# Patient Record
Sex: FEMALE | Race: BLACK OR AFRICAN AMERICAN | NOT HISPANIC OR LATINO | URBAN - METROPOLITAN AREA
[De-identification: names, ages, dates, MRNs, and addresses within clinical notes are randomized per-mention and may not be internally consistent; named-entity substitution may affect disease eponyms.]

---

## 2020-07-25 ENCOUNTER — TELEPHONE ENCOUNTER (OUTPATIENT)
Dept: URBAN - METROPOLITAN AREA CLINIC 13 | Facility: CLINIC | Age: 45
End: 2020-07-25

## 2020-07-25 RX ORDER — LISINOPRIL 20 MG/1
TAKE 1 TABLET DAILY TABLET ORAL
Refills: 0 | OUTPATIENT
Start: 2020-04-08 | End: 2020-05-28

## 2020-07-26 ENCOUNTER — TELEPHONE ENCOUNTER (OUTPATIENT)
Dept: URBAN - METROPOLITAN AREA CLINIC 13 | Facility: CLINIC | Age: 45
End: 2020-07-26

## 2020-07-26 RX ORDER — SUCRALFATE 1 G/1
TAKE 1 TABLET BY MOUTH BEFORE MEAL(S) AND AT BEDTIME ON AN EMPTY STOMA TABLET ORAL
Qty: 120 | Refills: 0 | Status: ACTIVE | COMMUNITY
Start: 2020-04-26

## 2020-07-26 RX ORDER — AMLODIPINE BESYLATE 5 MG/1
TK 1 T PO QD TABLET ORAL
Qty: 30 | Refills: 0 | Status: ACTIVE | COMMUNITY
Start: 2020-01-05

## 2020-07-26 RX ORDER — OMEPRAZOLE 40 MG/1
TK 1 C PO BID CAPSULE, DELAYED RELEASE ORAL
Qty: 180 | Refills: 0 | Status: ACTIVE | COMMUNITY
Start: 2019-03-13

## 2020-07-26 RX ORDER — CLOTRIMAZOLE AND BETAMETHASONE DIPROPIONATE 10; .5 MG/G; MG/G
APPLY CREAM TO AFFECTED AREA TWICE DAILY FOR 7 DAYS CREAM TOPICAL
Qty: 15 | Refills: 0 | Status: ACTIVE | COMMUNITY
Start: 2019-12-09

## 2020-07-26 RX ORDER — OLMESARTAN MEDOXOMIL 20 MG/1
TAKE 1 TABLET BY MOUTH ONCE DAILY TABLET, FILM COATED ORAL
Qty: 30 | Refills: 0 | Status: ACTIVE | COMMUNITY
Start: 2020-04-24

## 2020-07-26 RX ORDER — BUTALBITAL, ACETAMINOPHEN, AND CAFFEINE 50; 325; 40 MG/1; MG/1; MG/1
TK 1-2 TS PO Q 6 H PRF HA TABLET ORAL
Qty: 20 | Refills: 0 | Status: ACTIVE | COMMUNITY
Start: 2020-04-22

## 2020-07-26 RX ORDER — MONTELUKAST SODIUM 10 MG/1
TAKE 1 TABLET BY MOUTH ONCE DAILY TABLET, FILM COATED ORAL
Qty: 30 | Refills: 0 | Status: ACTIVE | COMMUNITY
Start: 2019-04-15

## 2020-07-26 RX ORDER — AMOXICILLIN 500 MG/1
TAKE 2 CAPSULES BY MOUTH TWICE DAILY CAPSULE ORAL
Qty: 40 | Refills: 0 | Status: ACTIVE | COMMUNITY
Start: 2020-03-02

## 2020-07-26 RX ORDER — AMLODIPINE BESYLATE 10 MG/1
TAKE 1 TABLET BY MOUTH ONCE DAILY TABLET ORAL
Qty: 90 | Refills: 0 | Status: ACTIVE | COMMUNITY
Start: 2020-02-19

## 2020-07-26 RX ORDER — CLINDAMYCIN HYDROCHLORIDE 300 MG/1
TAKE 1 CAPSULE BY MOUTH THREE TIMES DAILY CAPSULE ORAL
Qty: 15 | Refills: 0 | Status: ACTIVE | COMMUNITY
Start: 2020-01-08

## 2020-07-26 RX ORDER — ONDANSETRON 4 MG/1
DISSOLVE 1 TABLET IN MOUTH EVERY 8 HOURS AS NEEDED FOR NAUSEA AND VOMI TABLET, ORALLY DISINTEGRATING ORAL
Qty: 10 | Refills: 0 | Status: ACTIVE | COMMUNITY
Start: 2020-03-08

## 2020-07-26 RX ORDER — LEVOCETIRIZINE DIHYDROCHLORIDE 5 MG/1
TAKE 1 TABLET BY MOUTH AT BEDTIME TABLET ORAL
Qty: 30 | Refills: 0 | Status: ACTIVE | COMMUNITY
Start: 2018-08-13

## 2020-07-26 RX ORDER — SUCRALFATE 1 G/10ML
TK 10ML PO QID SUSPENSION ORAL
Qty: 420 | Refills: 0 | Status: ACTIVE | COMMUNITY
Start: 2020-03-27

## 2020-07-26 RX ORDER — SUMATRIPTAN SUCCINATE 25 MG/1
TAKE 1 TABLET BY MOUTH EVERY 2 HOURS AS NEEDED FOR MIGRAINE TABLET ORAL
Qty: 9 | Refills: 0 | Status: ACTIVE | COMMUNITY
Start: 2020-02-19

## 2020-07-26 RX ORDER — PREDNISONE 10 MG/1
TAKE 4 TABLETS BY MOUTH ONCE DAILY FOR 2 DAYS THEN 3 ONCE DAILY FOR 2 TABLET ORAL
Qty: 18 | Refills: 0 | Status: ACTIVE | COMMUNITY
Start: 2020-01-15

## 2020-07-26 RX ORDER — FAMOTIDINE 40 MG/1
TK 1 T PO BID TABLET ORAL
Qty: 60 | Refills: 0 | Status: ACTIVE | COMMUNITY
Start: 2020-01-13

## 2020-07-26 RX ORDER — OMEPRAZOLE 20 MG/1
TAKE 1 CAPSULE BY MOUTH ONCE DAILY CAPSULE, DELAYED RELEASE ORAL
Qty: 30 | Refills: 0 | Status: ACTIVE | COMMUNITY
Start: 2018-09-10

## 2020-07-26 RX ORDER — SUCRALFATE 1 G/1
TK 1 T PO BID BEFORE MEALS TABLET ORAL
Qty: 20 | Refills: 0 | Status: ACTIVE | COMMUNITY
Start: 2020-02-25

## 2020-07-26 RX ORDER — DEXLANSOPRAZOLE 30 MG/1
TAKE 1 CAPSULE DAILY EVERY MORNING BEFORE BREAKFAST CAPSULE, DELAYED RELEASE ORAL
Refills: 3 | Status: ACTIVE | COMMUNITY

## 2020-07-26 RX ORDER — HYDROCHLOROTHIAZIDE 25 MG/1
TAKE 1 TABLET BY MOUTH ONCE DAILY TABLET ORAL
Qty: 90 | Refills: 0 | Status: ACTIVE | COMMUNITY
Start: 2019-11-16

## 2020-07-26 RX ORDER — HYDROCODONE BITARTRATE AND ACETAMINOPHEN 5; 325 MG/1; MG/1
TK 1 T PO Q 6 H PRN FOR PAIN TABLET ORAL
Qty: 10 | Refills: 0 | Status: ACTIVE | COMMUNITY
Start: 2020-02-25

## 2020-07-26 RX ORDER — CIPROFLOXACIN HYDROCHLORIDE 500 MG/1
TAKE 1 TABLET BY MOUTH TWICE DAILY TABLET, FILM COATED ORAL
Qty: 20 | Refills: 0 | Status: ACTIVE | COMMUNITY
Start: 2019-10-16

## 2020-07-26 RX ORDER — METOCLOPRAMIDE 10 MG/1
TAKE 1 TABLET BY MOUTH BEFORE MEAL(S) AND AT BEDTIME FOR 7 DAYS TABLET ORAL
Qty: 30 | Refills: 0 | Status: ACTIVE | COMMUNITY
Start: 2020-04-26

## 2020-07-26 RX ORDER — SUCRALFATE 1 G/10ML
TAKE 10ML BY MOUTH 4 TIMES A DAY SUSPENSION ORAL
Qty: 420 | Refills: 0 | Status: ACTIVE | COMMUNITY
Start: 2020-04-20

## 2020-07-26 RX ORDER — METOCLOPRAMIDE 10 MG/1
TAKE 1 TABLET BY MOUTH EVERY 6 TO 8 HOURS WITH MEALS TABLET ORAL
Qty: 120 | Refills: 0 | Status: ACTIVE | COMMUNITY
Start: 2020-03-28

## 2020-07-26 RX ORDER — DOCUSATE SODIUM 100 MG/1
TAKE 1 CAPSULE TWICE DAILY AS NEEDED CAPSULE, LIQUID FILLED ORAL
Refills: 0 | Status: ACTIVE | COMMUNITY
Start: 2020-05-19

## 2020-07-26 RX ORDER — OLMESARTAN MEDOXOMIL 20 MG/1
TAKE 1 & 1 2 (ONE & ONE HALF) TABLETS BY MOUTH ONCE DAILY TABLET, FILM COATED ORAL
Qty: 30 | Refills: 0 | Status: ACTIVE | COMMUNITY
Start: 2020-05-08

## 2020-07-26 RX ORDER — ESOMEPRAZOLE MAGNESIUM 40 MG/1
TAKE 1 CAPSULE BY MOUTH ONCE DAILY CAPSULE, DELAYED RELEASE PELLETS ORAL
Qty: 30 | Refills: 0 | Status: ACTIVE | COMMUNITY
Start: 2020-01-19

## 2020-07-26 RX ORDER — HYDROCHLOROTHIAZIDE 25 MG/1
TAKE 1 TABLET BY MOUTH ONCE DAILY TABLET ORAL
Qty: 30 | Refills: 0 | Status: ACTIVE | COMMUNITY
Start: 2019-04-15

## 2020-07-26 RX ORDER — PROMETHAZINE HYDROCHLORIDE 25 MG/1
TAKE 1 TABLET BY MOUTH EVERY 6 HOURS AS NEEDED FOR NAUSEA FOR UP TO 7 TABLET ORAL
Qty: 20 | Refills: 0 | Status: ACTIVE | COMMUNITY
Start: 2020-04-12

## 2020-07-26 RX ORDER — FLUTICASONE PROPIONATE 50 UG/1
USE 2 SPRAY(S) IN EACH NOSTRIL ONCE DAILY SPRAY, METERED NASAL
Qty: 16 | Refills: 0 | Status: ACTIVE | COMMUNITY
Start: 2019-12-07

## 2020-07-26 RX ORDER — POLYETHYLENE GLYCOL 3350, SODIUM CHLORIDE, SODIUM BICARBONATE AND POTASSIUM CHLORIDE WITH LEMON FLAVOR 420; 11.2; 5.72; 1.48 G/4L; G/4L; G/4L; G/4L
MIX AND DRINK UTD POWDER, FOR SOLUTION ORAL
Qty: 4000 | Refills: 0 | Status: ACTIVE | COMMUNITY
Start: 2020-04-27

## 2020-07-26 RX ORDER — OLMESARTAN MEDOXOMIL 40 MG/1
TK 1 T PO ONCE A DAY TABLET ORAL
Qty: 30 | Refills: 0 | Status: ACTIVE | COMMUNITY
Start: 2020-05-11

## 2020-07-26 RX ORDER — AMOXICILLIN 500 MG/1
TAKE 1 CAPSULE BY MOUTH EVERY 12 HOURS CAPSULE ORAL
Qty: 20 | Refills: 0 | Status: ACTIVE | COMMUNITY
Start: 2019-12-07

## 2020-07-26 RX ORDER — CETIRIZINE HYDROCHLORIDE 10 MG/1
TAKE 1 TABLET BY MOUTH ONCE DAILY TABLET, FILM COATED ORAL
Qty: 14 | Refills: 0 | Status: ACTIVE | COMMUNITY
Start: 2019-12-07

## 2020-07-26 RX ORDER — DOCUSATE SODIUM 100 MG/1
TAKE 1 CAPSULE BY MOUTH TWICE DAILY AS NEEDED FOR CONSTIPATION CAPSULE, LIQUID FILLED ORAL
Qty: 60 | Refills: 0 | Status: ACTIVE | COMMUNITY
Start: 2020-04-01

## 2020-07-26 RX ORDER — SUMATRIPTAN SUCCINATE 50 MG/1
TAKE 1 TABLET BY MOUTH ONCE DAILY AS NEEDED FOR MIGRAINE HEADACHE TABLET, FILM COATED ORAL
Qty: 9 | Refills: 0 | Status: ACTIVE | COMMUNITY
Start: 2019-10-16

## 2020-07-26 RX ORDER — PANTOPRAZOLE SODIUM 40 MG/1
TAKE 1 TABLET BY MOUTH ONCE DAILY IF SYMPTOMS PERSIST AFTER 1 WEEK TAK TABLET, DELAYED RELEASE ORAL
Qty: 53 | Refills: 0 | Status: ACTIVE | COMMUNITY
Start: 2020-02-28

## 2020-07-26 RX ORDER — CLARITHROMYCIN 500 MG/1
TAKE 1 TABLET BY MOUTH TWICE DAILY TABLET, FILM COATED ORAL
Qty: 20 | Refills: 0 | Status: ACTIVE | COMMUNITY
Start: 2020-03-02

## 2020-07-26 RX ORDER — POTASSIUM CHLORIDE 20 MEQ/1
TAKE 1 TABLET BY MOUTH TWICE DAILY TABLET, EXTENDED RELEASE ORAL
Qty: 6 | Refills: 0 | Status: ACTIVE | COMMUNITY
Start: 2020-01-04

## 2020-07-26 RX ORDER — BUDESONIDE 3 MG/1
TAKE 3 CAPSULES BY MOUTH IN THE MORNING CAPSULE, COATED PELLETS ORAL
Qty: 60 | Refills: 0 | Status: ACTIVE | COMMUNITY
Start: 2020-05-07

## 2020-07-26 RX ORDER — KETOROLAC TROMETHAMINE 10 MG/1
TAKE 1 TABLET BY MOUTH EVERY 12 HOURS AS NEEDED FOR PAIN TABLET, FILM COATED ORAL
Qty: 10 | Refills: 0 | Status: ACTIVE | COMMUNITY
Start: 2019-12-07

## 2020-07-26 RX ORDER — SULFAMETHOXAZOLE AND TRIMETHOPRIM 800; 160 MG/1; MG/1
TAKE 1 TABLET BY MOUTH TWICE DAILY TABLET ORAL
Qty: 6 | Refills: 0 | Status: ACTIVE | COMMUNITY
Start: 2019-05-24

## 2020-07-26 RX ORDER — HYDROCHLOROTHIAZIDE 12.5 MG/1
TAKE 1 TABLET BY MOUTH ONCE DAILY TABLET ORAL
Qty: 30 | Refills: 0 | Status: ACTIVE | COMMUNITY
Start: 2019-10-16

## 2020-07-26 RX ORDER — CEPHALEXIN 500 MG/1
TK ONE C PO QID FOR 7 DAYS CAPSULE ORAL
Qty: 28 | Refills: 0 | Status: ACTIVE | COMMUNITY
Start: 2020-05-24

## 2020-07-26 RX ORDER — PREDNISONE 10 MG/1
TAKE 4 TABLETS BY MOUTH ONCE DAILY FOR 2 DAYS THEN 3 TABS ONCE DAILY F TABLET ORAL
Qty: 16 | Refills: 0 | Status: ACTIVE | COMMUNITY
Start: 2019-12-09

## 2020-07-26 RX ORDER — DOXYCYCLINE 100 MG/1
TAKE 1 CAPSULE BY MOUTH TWICE DAILY FOR 10 DAYS CAPSULE ORAL
Qty: 20 | Refills: 0 | Status: ACTIVE | COMMUNITY
Start: 2019-12-19

## 2020-07-26 RX ORDER — OLMESARTAN MEDOXOMIL 40 MG/1
TK 2 TS PO D FOR 60 DAYS TABLET ORAL
Qty: 30 | Refills: 0 | Status: ACTIVE | COMMUNITY
Start: 2020-05-15

## 2020-07-26 RX ORDER — LANSOPRAZOLE 30 MG/1
TAKE 1 CAPSULE TWICE DAILY CAPSULE, DELAYED RELEASE ORAL
Qty: 60 | Refills: 0 | Status: ACTIVE | COMMUNITY
Start: 2020-05-28

## 2020-07-26 RX ORDER — POTASSIUM CHLORIDE 750 MG/1
TAKE 1 TABLET BY MOUTH ONCE DAILY TABLET, EXTENDED RELEASE ORAL
Qty: 10 | Refills: 0 | Status: ACTIVE | COMMUNITY
Start: 2020-04-01

## 2020-07-26 RX ORDER — FAMOTIDINE 40 MG/1
TAKE 1 TABLET BY MOUTH TWICE DAILY AS NEEDED TABLET ORAL
Qty: 60 | Refills: 0 | Status: ACTIVE | COMMUNITY
Start: 2019-10-16

## 2020-07-26 RX ORDER — DICYCLOMINE HYDROCHLORIDE 20 MG/1
TAKE 1 TABLET PO EVERY 6 HOURS PRN TABLET ORAL
Qty: 20 | Refills: 0 | Status: ACTIVE | COMMUNITY
Start: 2020-05-19

## 2020-07-26 RX ORDER — LISINOPRIL 20 MG/1
TK 1 T PO QD TABLET ORAL
Qty: 90 | Refills: 0 | Status: ACTIVE | COMMUNITY
Start: 2020-04-08

## 2023-01-20 ENCOUNTER — OFFICE VISIT (OUTPATIENT)
Dept: URBAN - METROPOLITAN AREA CLINIC 113 | Facility: CLINIC | Age: 48
End: 2023-01-20
Payer: COMMERCIAL

## 2023-01-20 VITALS
SYSTOLIC BLOOD PRESSURE: 126 MMHG | WEIGHT: 174 LBS | DIASTOLIC BLOOD PRESSURE: 80 MMHG | TEMPERATURE: 97.6 F | BODY MASS INDEX: 26.37 KG/M2 | HEIGHT: 68 IN | HEART RATE: 101 BPM | RESPIRATION RATE: 18 BRPM

## 2023-01-20 DIAGNOSIS — R19.7 DIARRHEA: ICD-10-CM

## 2023-01-20 DIAGNOSIS — R10.84 GENERALIZED ABDOMINAL PAIN: ICD-10-CM

## 2023-01-20 PROCEDURE — 99214 OFFICE O/P EST MOD 30 MIN: CPT | Performed by: NURSE PRACTITIONER

## 2023-01-20 RX ORDER — ONDANSETRON 4 MG/1
DISSOLVE 1 TABLET IN MOUTH EVERY 8 HOURS AS NEEDED FOR NAUSEA AND VOMI TABLET, ORALLY DISINTEGRATING ORAL
Qty: 10 | Refills: 0 | Status: ON HOLD | COMMUNITY
Start: 2020-03-08

## 2023-01-20 RX ORDER — SUMATRIPTAN SUCCINATE 25 MG/1
TAKE 1 TABLET BY MOUTH EVERY 2 HOURS AS NEEDED FOR MIGRAINE TABLET ORAL
Qty: 9 | Refills: 0 | Status: ON HOLD | COMMUNITY
Start: 2020-02-19

## 2023-01-20 RX ORDER — OLMESARTAN MEDOXOMIL 20 MG/1
TAKE 1 TABLET BY MOUTH ONCE DAILY TABLET, FILM COATED ORAL
Qty: 30 | Refills: 0 | Status: ON HOLD | COMMUNITY
Start: 2020-04-24

## 2023-01-20 RX ORDER — FLUTICASONE PROPIONATE 50 UG/1
USE 2 SPRAY(S) IN EACH NOSTRIL ONCE DAILY SPRAY, METERED NASAL
Qty: 16 | Refills: 0 | Status: ACTIVE | COMMUNITY
Start: 2019-12-07

## 2023-01-20 RX ORDER — CLINDAMYCIN HYDROCHLORIDE 300 MG/1
TAKE 1 CAPSULE BY MOUTH THREE TIMES DAILY CAPSULE ORAL
Qty: 15 | Refills: 0 | Status: ON HOLD | COMMUNITY
Start: 2020-01-08

## 2023-01-20 RX ORDER — DICYCLOMINE HYDROCHLORIDE 20 MG/1
TAKE 1 TABLET PO EVERY 6 HOURS PRN TABLET ORAL
Qty: 20 | Refills: 0 | Status: ON HOLD | COMMUNITY
Start: 2020-05-19

## 2023-01-20 RX ORDER — FAMOTIDINE 40 MG/1
TAKE 1 TABLET BY MOUTH TWICE DAILY AS NEEDED TABLET ORAL
Qty: 60 | Refills: 0 | Status: ACTIVE | COMMUNITY
Start: 2019-10-16

## 2023-01-20 RX ORDER — POTASSIUM CHLORIDE 750 MG/1
TAKE 1 TABLET BY MOUTH ONCE DAILY TABLET, EXTENDED RELEASE ORAL
Qty: 10 | Refills: 0 | Status: ON HOLD | COMMUNITY
Start: 2020-04-01

## 2023-01-20 RX ORDER — SUMATRIPTAN SUCCINATE 50 MG/1
TAKE 1 TABLET BY MOUTH ONCE DAILY AS NEEDED FOR MIGRAINE HEADACHE TABLET, FILM COATED ORAL
Qty: 9 | Refills: 0 | Status: ON HOLD | COMMUNITY
Start: 2019-10-16

## 2023-01-20 RX ORDER — CEPHALEXIN 500 MG/1
TK ONE C PO QID FOR 7 DAYS CAPSULE ORAL
Qty: 28 | Refills: 0 | Status: ON HOLD | COMMUNITY
Start: 2020-05-24

## 2023-01-20 RX ORDER — AMOXICILLIN 500 MG/1
TAKE 1 CAPSULE BY MOUTH EVERY 12 HOURS CAPSULE ORAL
Qty: 20 | Refills: 0 | Status: ON HOLD | COMMUNITY
Start: 2019-12-07

## 2023-01-20 RX ORDER — HYDROCHLOROTHIAZIDE 12.5 MG/1
TAKE 1 TABLET BY MOUTH ONCE DAILY TABLET ORAL
Qty: 30 | Refills: 0 | Status: ON HOLD | COMMUNITY
Start: 2019-10-16

## 2023-01-20 RX ORDER — BUTALBITAL, ACETAMINOPHEN, AND CAFFEINE 50; 325; 40 MG/1; MG/1; MG/1
TK 1-2 TS PO Q 6 H PRF HA TABLET ORAL
Qty: 20 | Refills: 0 | Status: ON HOLD | COMMUNITY
Start: 2020-04-22

## 2023-01-20 RX ORDER — AMLODIPINE BESYLATE 10 MG/1
TAKE 1 TABLET BY MOUTH ONCE DAILY TABLET ORAL
Qty: 90 | Refills: 0 | Status: ON HOLD | COMMUNITY
Start: 2020-02-19

## 2023-01-20 RX ORDER — HYDROCHLOROTHIAZIDE 25 MG/1
TAKE 1 TABLET BY MOUTH ONCE DAILY TABLET ORAL
Qty: 30 | Refills: 0 | Status: ACTIVE | COMMUNITY
Start: 2019-04-15

## 2023-01-20 RX ORDER — OMEPRAZOLE 20 MG/1
TAKE 1 CAPSULE BY MOUTH ONCE DAILY CAPSULE, DELAYED RELEASE ORAL
Qty: 30 | Refills: 0 | Status: ON HOLD | COMMUNITY
Start: 2018-09-10

## 2023-01-20 RX ORDER — CLARITHROMYCIN 500 MG/1
TAKE 1 TABLET BY MOUTH TWICE DAILY TABLET, FILM COATED ORAL
Qty: 20 | Refills: 0 | Status: ON HOLD | COMMUNITY
Start: 2020-03-02

## 2023-01-20 RX ORDER — SUCRALFATE 1 G/10ML
TK 10ML PO QID SUSPENSION ORAL
Qty: 420 | Refills: 0 | Status: ON HOLD | COMMUNITY
Start: 2020-03-27

## 2023-01-20 RX ORDER — BUDESONIDE 3 MG/1
TAKE 3 CAPSULES BY MOUTH IN THE MORNING CAPSULE, COATED PELLETS ORAL
Qty: 60 | Refills: 0 | Status: ACTIVE | COMMUNITY
Start: 2020-05-07

## 2023-01-20 RX ORDER — OLMESARTAN MEDOXOMIL 40 MG/1
TK 2 TS PO D FOR 60 DAYS TABLET ORAL
Qty: 30 | Refills: 0 | Status: ON HOLD | COMMUNITY
Start: 2020-05-15

## 2023-01-20 RX ORDER — PREDNISONE 10 MG/1
TAKE 4 TABLETS BY MOUTH ONCE DAILY FOR 2 DAYS THEN 3 TABS ONCE DAILY F TABLET ORAL
Qty: 16 | Refills: 0 | Status: ON HOLD | COMMUNITY
Start: 2019-12-09

## 2023-01-20 RX ORDER — CETIRIZINE HYDROCHLORIDE 10 MG/1
TAKE 1 TABLET BY MOUTH ONCE DAILY TABLET, FILM COATED ORAL
Qty: 14 | Refills: 0 | Status: ACTIVE | COMMUNITY
Start: 2019-12-07

## 2023-01-20 RX ORDER — PANTOPRAZOLE SODIUM 40 MG/1
TAKE 1 TABLET BY MOUTH ONCE DAILY IF SYMPTOMS PERSIST AFTER 1 WEEK TAK TABLET, DELAYED RELEASE ORAL
Qty: 53 | Refills: 0 | Status: ACTIVE | COMMUNITY
Start: 2020-02-28

## 2023-01-20 RX ORDER — LANSOPRAZOLE 30 MG/1
TAKE 1 CAPSULE TWICE DAILY CAPSULE, DELAYED RELEASE ORAL
Qty: 60 | Refills: 0 | Status: ON HOLD | COMMUNITY
Start: 2020-05-28

## 2023-01-20 RX ORDER — SULFAMETHOXAZOLE AND TRIMETHOPRIM 800; 160 MG/1; MG/1
TAKE 1 TABLET BY MOUTH TWICE DAILY TABLET ORAL
Qty: 6 | Refills: 0 | Status: ON HOLD | COMMUNITY
Start: 2019-05-24

## 2023-01-20 RX ORDER — LISINOPRIL 20 MG/1
TK 1 T PO QD TABLET ORAL
Qty: 90 | Refills: 0 | Status: ON HOLD | COMMUNITY
Start: 2020-04-08

## 2023-01-20 RX ORDER — METOCLOPRAMIDE 10 MG/1
TAKE 1 TABLET BY MOUTH EVERY 6 TO 8 HOURS WITH MEALS TABLET ORAL
Qty: 120 | Refills: 0 | Status: ON HOLD | COMMUNITY
Start: 2020-03-28

## 2023-01-20 RX ORDER — AMLODIPINE BESYLATE 5 MG/1
TK 1 T PO QD TABLET ORAL
Qty: 30 | Refills: 0 | Status: ON HOLD | COMMUNITY
Start: 2020-01-05

## 2023-01-20 RX ORDER — OLMESARTAN MEDOXOMIL 40 MG/1
TK 1 T PO ONCE A DAY TABLET ORAL
Qty: 30 | Refills: 0 | Status: ON HOLD | COMMUNITY
Start: 2020-05-11

## 2023-01-20 RX ORDER — KETOROLAC TROMETHAMINE 10 MG/1
TAKE 1 TABLET BY MOUTH EVERY 12 HOURS AS NEEDED FOR PAIN TABLET, FILM COATED ORAL
Qty: 10 | Refills: 0 | Status: ON HOLD | COMMUNITY
Start: 2019-12-07

## 2023-01-20 RX ORDER — PROMETHAZINE HYDROCHLORIDE 25 MG/1
TAKE 1 TABLET BY MOUTH EVERY 6 HOURS AS NEEDED FOR NAUSEA FOR UP TO 7 TABLET ORAL
Qty: 20 | Refills: 0 | Status: ON HOLD | COMMUNITY
Start: 2020-04-12

## 2023-01-20 RX ORDER — ESOMEPRAZOLE MAGNESIUM 40 MG/1
TAKE 1 CAPSULE BY MOUTH ONCE DAILY CAPSULE, DELAYED RELEASE PELLETS ORAL
Qty: 30 | Refills: 0 | Status: ON HOLD | COMMUNITY
Start: 2020-01-19

## 2023-01-20 RX ORDER — SUCRALFATE 1 G/1
TK 1 T PO BID BEFORE MEALS TABLET ORAL
Qty: 20 | Refills: 0 | Status: ON HOLD | COMMUNITY
Start: 2020-02-25

## 2023-01-20 RX ORDER — DEXLANSOPRAZOLE 30 MG/1
TAKE 1 CAPSULE DAILY EVERY MORNING BEFORE BREAKFAST CAPSULE, DELAYED RELEASE ORAL
Refills: 3 | Status: ON HOLD | COMMUNITY

## 2023-01-20 RX ORDER — DOCUSATE SODIUM 100 MG/1
TAKE 1 CAPSULE BY MOUTH TWICE DAILY AS NEEDED FOR CONSTIPATION CAPSULE, LIQUID FILLED ORAL
Qty: 60 | Refills: 0 | Status: ON HOLD | COMMUNITY
Start: 2020-04-01

## 2023-01-20 RX ORDER — POLYETHYLENE GLYCOL 3350, SODIUM CHLORIDE, SODIUM BICARBONATE AND POTASSIUM CHLORIDE WITH LEMON FLAVOR 420; 11.2; 5.72; 1.48 G/4L; G/4L; G/4L; G/4L
MIX AND DRINK UTD POWDER, FOR SOLUTION ORAL
Qty: 4000 | Refills: 0 | Status: ON HOLD | COMMUNITY
Start: 2020-04-27

## 2023-01-20 RX ORDER — CLOTRIMAZOLE AND BETAMETHASONE DIPROPIONATE 10; .5 MG/G; MG/G
APPLY CREAM TO AFFECTED AREA TWICE DAILY FOR 7 DAYS CREAM TOPICAL
Qty: 15 | Refills: 0 | Status: ON HOLD | COMMUNITY
Start: 2019-12-09

## 2023-01-20 RX ORDER — OMEPRAZOLE 40 MG/1
TK 1 C PO BID CAPSULE, DELAYED RELEASE ORAL
Qty: 180 | Refills: 0 | Status: ON HOLD | COMMUNITY
Start: 2019-03-13

## 2023-01-20 RX ORDER — POTASSIUM CHLORIDE 20 MEQ/1
TAKE 1 TABLET BY MOUTH TWICE DAILY TABLET, EXTENDED RELEASE ORAL
Qty: 6 | Refills: 0 | Status: ON HOLD | COMMUNITY
Start: 2020-01-04

## 2023-01-20 RX ORDER — CIPROFLOXACIN HYDROCHLORIDE 500 MG/1
TAKE 1 TABLET BY MOUTH TWICE DAILY TABLET, FILM COATED ORAL
Qty: 20 | Refills: 0 | Status: ON HOLD | COMMUNITY
Start: 2019-10-16

## 2023-01-20 RX ORDER — HYDROCODONE BITARTRATE AND ACETAMINOPHEN 5; 325 MG/1; MG/1
TK 1 T PO Q 6 H PRN FOR PAIN TABLET ORAL
Qty: 10 | Refills: 0 | Status: ON HOLD | COMMUNITY
Start: 2020-02-25

## 2023-01-20 RX ORDER — MONTELUKAST SODIUM 10 MG/1
TAKE 1 TABLET BY MOUTH ONCE DAILY TABLET, FILM COATED ORAL
Qty: 30 | Refills: 0 | Status: ON HOLD | COMMUNITY
Start: 2019-04-15

## 2023-01-20 RX ORDER — LEVOCETIRIZINE DIHYDROCHLORIDE 5 MG/1
TAKE 1 TABLET BY MOUTH AT BEDTIME TABLET ORAL
Qty: 30 | Refills: 0 | Status: ON HOLD | COMMUNITY
Start: 2018-08-13

## 2023-01-20 RX ORDER — DOXYCYCLINE 100 MG/1
TAKE 1 CAPSULE BY MOUTH TWICE DAILY FOR 10 DAYS CAPSULE ORAL
Qty: 20 | Refills: 0 | Status: ON HOLD | COMMUNITY
Start: 2019-12-19

## 2023-01-20 NOTE — HPI-TODAY'S VISIT:
46yo female with a history of hypertension, GERD, presenting for evaluation of abdominal pain.  She was previously seen in the office in May 2020 for a second opinion regarding chronic GI symptoms of nausea, regurgitation, vomiting, abdominal pain, and constipation. Previous 4-hour gastric emptying study suggested gastroparesis. Prior EGD was fairly unremarkable but colonoscopy did reveal focal active colitis possibly related to infectious enteritis or NSAID use, and removal of a serrated adenoma. US and CT were unremarkable but gastrin and chromogranin levels were elevated concerning for underlying gastrinoma. She was recommended somatostatin receptor-based imaging study and/or EUS. Functional dyspepsia and irritable bowel syndrome were within the differential. Various options were discussed, including consideration for a trial of Motegrity, smart pill to assess whole gut motility, EUS, and capsule endoscopy. She was encouraged to follow-up with a GI who accepted her insurance with consideration for a referral to a tertiary care center. She ultimately returned to [facility] following that last visit due to insurance barriers. She has undergone multiple imaging studies, repeat colonoscopy, and stool studies. She states she experienced a symptom exacerbation in July and underwent a colonoscopy in October which showed inflammation. She was started on budesonide and the symptoms resolved, but have recurred. She does not have any knowledge of a diagnosis of microscopic or collagenous colitis, repeatedly stating "they cannot figure out where the inflammation is coming from." She complains of mid to lower abdominal cramping and diffuse bloating. She is having 4-5 loose stools per day with associated mucus but no red blood. Her PCP started her on a repeat 4-week course of budesonide about two days ago and her symptoms have already improved. Her abdominal pain has lessened and stool frequency has reduced to 2x per day. She has been on multiple rounds of antibiotics with resultant issues with flush; this is currently being managed by ENT. She is also followed by urology for recurrent UTIs. She states during exacerbations of her abdominal symptoms, her heart will race and she will have kidney issues. She has had numerous ER visit, most recently in December at [facility]

## 2023-03-01 ENCOUNTER — WEB ENCOUNTER (OUTPATIENT)
Dept: URBAN - METROPOLITAN AREA CLINIC 113 | Facility: CLINIC | Age: 48
End: 2023-03-01

## 2023-03-07 ENCOUNTER — OFFICE VISIT (OUTPATIENT)
Dept: URBAN - METROPOLITAN AREA CLINIC 113 | Facility: CLINIC | Age: 48
End: 2023-03-07
Payer: COMMERCIAL

## 2023-03-07 VITALS
TEMPERATURE: 97.5 F | RESPIRATION RATE: 16 BRPM | SYSTOLIC BLOOD PRESSURE: 134 MMHG | HEART RATE: 96 BPM | WEIGHT: 173 LBS | HEIGHT: 68 IN | BODY MASS INDEX: 26.22 KG/M2 | DIASTOLIC BLOOD PRESSURE: 88 MMHG

## 2023-03-07 DIAGNOSIS — R10.84 GENERALIZED ABDOMINAL PAIN: ICD-10-CM

## 2023-03-07 DIAGNOSIS — R19.7 DIARRHEA: ICD-10-CM

## 2023-03-07 DIAGNOSIS — R10.13 DYSPEPSIA: ICD-10-CM

## 2023-03-07 DIAGNOSIS — K59.1 FUNCTIONAL DIARRHEA: ICD-10-CM

## 2023-03-07 DIAGNOSIS — K58.0 IRRITABLE BOWEL SYNDROME WITH DIARRHEA: ICD-10-CM

## 2023-03-07 DIAGNOSIS — K30 DELAYED GASTRIC EMPTYING: ICD-10-CM

## 2023-03-07 DIAGNOSIS — K52.3 INDETERMINATE COLITIS: ICD-10-CM

## 2023-03-07 PROBLEM — 235746007: Status: ACTIVE | Noted: 2023-03-07

## 2023-03-07 PROBLEM — 314944001: Status: ACTIVE | Noted: 2023-03-07

## 2023-03-07 PROBLEM — 102614006 GENERALIZED ABDOMINAL PAIN: Status: ACTIVE | Noted: 2023-03-07

## 2023-03-07 PROBLEM — 162031009: Status: ACTIVE | Noted: 2023-03-07

## 2023-03-07 PROBLEM — 197125005: Status: ACTIVE | Noted: 2023-03-07

## 2023-03-07 PROBLEM — 47812002: Status: ACTIVE | Noted: 2023-03-07

## 2023-03-07 PROBLEM — 62315008 DIARRHEA: Status: ACTIVE | Noted: 2023-03-07

## 2023-03-07 PROCEDURE — 99214 OFFICE O/P EST MOD 30 MIN: CPT | Performed by: INTERNAL MEDICINE

## 2023-03-07 RX ORDER — SULFAMETHOXAZOLE AND TRIMETHOPRIM 800; 160 MG/1; MG/1
TAKE 1 TABLET BY MOUTH TWICE DAILY TABLET ORAL
Qty: 6 | Refills: 0 | Status: ON HOLD | COMMUNITY
Start: 2019-05-24

## 2023-03-07 RX ORDER — CLARITHROMYCIN 500 MG/1
TAKE 1 TABLET BY MOUTH TWICE DAILY TABLET, FILM COATED ORAL
Qty: 20 | Refills: 0 | Status: ON HOLD | COMMUNITY
Start: 2020-03-02

## 2023-03-07 RX ORDER — CEPHALEXIN 500 MG/1
TK ONE C PO QID FOR 7 DAYS CAPSULE ORAL
Qty: 28 | Refills: 0 | Status: ON HOLD | COMMUNITY
Start: 2020-05-24

## 2023-03-07 RX ORDER — AMOXICILLIN 500 MG/1
TAKE 1 CAPSULE BY MOUTH EVERY 12 HOURS CAPSULE ORAL
Qty: 20 | Refills: 0 | Status: ON HOLD | COMMUNITY
Start: 2019-12-07

## 2023-03-07 RX ORDER — SUMATRIPTAN SUCCINATE 25 MG/1
TAKE 1 TABLET BY MOUTH EVERY 2 HOURS AS NEEDED FOR MIGRAINE TABLET ORAL
Qty: 9 | Refills: 0 | Status: ON HOLD | COMMUNITY
Start: 2020-02-19

## 2023-03-07 RX ORDER — LISINOPRIL 20 MG/1
TK 1 T PO QD TABLET ORAL
Qty: 90 | Refills: 0 | Status: ON HOLD | COMMUNITY
Start: 2020-04-08

## 2023-03-07 RX ORDER — OMEPRAZOLE 20 MG/1
TAKE 1 CAPSULE BY MOUTH ONCE DAILY CAPSULE, DELAYED RELEASE ORAL
Qty: 30 | Refills: 0 | Status: ON HOLD | COMMUNITY
Start: 2018-09-10

## 2023-03-07 RX ORDER — HYDROCHLOROTHIAZIDE 25 MG/1
TAKE 1 TABLET BY MOUTH ONCE DAILY TABLET ORAL
Qty: 30 | Refills: 0 | Status: ACTIVE | COMMUNITY
Start: 2019-04-15

## 2023-03-07 RX ORDER — DICYCLOMINE HYDROCHLORIDE 20 MG/1
TAKE 1 TABLET PO EVERY 6 HOURS PRN TABLET ORAL
Qty: 20 | Refills: 0 | Status: ON HOLD | COMMUNITY
Start: 2020-05-19

## 2023-03-07 RX ORDER — HYDROCODONE BITARTRATE AND ACETAMINOPHEN 5; 325 MG/1; MG/1
TK 1 T PO Q 6 H PRN FOR PAIN TABLET ORAL
Qty: 10 | Refills: 0 | Status: ON HOLD | COMMUNITY
Start: 2020-02-25

## 2023-03-07 RX ORDER — POTASSIUM CHLORIDE 750 MG/1
TAKE 1 TABLET BY MOUTH ONCE DAILY TABLET, EXTENDED RELEASE ORAL
Qty: 10 | Refills: 0 | Status: ON HOLD | COMMUNITY
Start: 2020-04-01

## 2023-03-07 RX ORDER — OMEPRAZOLE 40 MG/1
TK 1 C PO BID CAPSULE, DELAYED RELEASE ORAL
Qty: 180 | Refills: 0 | Status: ON HOLD | COMMUNITY
Start: 2019-03-13

## 2023-03-07 RX ORDER — PROMETHAZINE HYDROCHLORIDE 25 MG/1
TAKE 1 TABLET BY MOUTH EVERY 6 HOURS AS NEEDED FOR NAUSEA FOR UP TO 7 TABLET ORAL
Qty: 20 | Refills: 0 | Status: ON HOLD | COMMUNITY
Start: 2020-04-12

## 2023-03-07 RX ORDER — POLYETHYLENE GLYCOL 3350, SODIUM CHLORIDE, SODIUM BICARBONATE AND POTASSIUM CHLORIDE WITH LEMON FLAVOR 420; 11.2; 5.72; 1.48 G/4L; G/4L; G/4L; G/4L
MIX AND DRINK UTD POWDER, FOR SOLUTION ORAL
Qty: 4000 | Refills: 0 | Status: ON HOLD | COMMUNITY
Start: 2020-04-27

## 2023-03-07 RX ORDER — SUMATRIPTAN SUCCINATE 50 MG/1
TAKE 1 TABLET BY MOUTH ONCE DAILY AS NEEDED FOR MIGRAINE HEADACHE TABLET, FILM COATED ORAL
Qty: 9 | Refills: 0 | Status: ON HOLD | COMMUNITY
Start: 2019-10-16

## 2023-03-07 RX ORDER — BUDESONIDE 3 MG/1
TAKE 3 CAPSULES BY MOUTH IN THE MORNING CAPSULE, COATED PELLETS ORAL
OUTPATIENT
Start: 2020-05-07

## 2023-03-07 RX ORDER — METOCLOPRAMIDE 10 MG/1
TAKE 1 TABLET BY MOUTH EVERY 6 TO 8 HOURS WITH MEALS TABLET ORAL
Qty: 120 | Refills: 0 | Status: ON HOLD | COMMUNITY
Start: 2020-03-28

## 2023-03-07 RX ORDER — ESOMEPRAZOLE MAGNESIUM 40 MG/1
TAKE 1 CAPSULE BY MOUTH ONCE DAILY CAPSULE, DELAYED RELEASE PELLETS ORAL
Qty: 30 | Refills: 0 | Status: ON HOLD | COMMUNITY
Start: 2020-01-19

## 2023-03-07 RX ORDER — OLMESARTAN MEDOXOMIL 40 MG/1
TK 2 TS PO D FOR 60 DAYS TABLET ORAL
Qty: 30 | Refills: 0 | Status: ON HOLD | COMMUNITY
Start: 2020-05-15

## 2023-03-07 RX ORDER — BUTALBITAL, ACETAMINOPHEN, AND CAFFEINE 50; 325; 40 MG/1; MG/1; MG/1
TK 1-2 TS PO Q 6 H PRF HA TABLET ORAL
Qty: 20 | Refills: 0 | Status: ON HOLD | COMMUNITY
Start: 2020-04-22

## 2023-03-07 RX ORDER — SUCRALFATE 1 G/10ML
TK 10ML PO QID SUSPENSION ORAL
Qty: 420 | Refills: 0 | Status: ON HOLD | COMMUNITY
Start: 2020-03-27

## 2023-03-07 RX ORDER — POTASSIUM CHLORIDE 20 MEQ/1
TAKE 1 TABLET BY MOUTH TWICE DAILY TABLET, EXTENDED RELEASE ORAL
Qty: 6 | Refills: 0 | Status: ON HOLD | COMMUNITY
Start: 2020-01-04

## 2023-03-07 RX ORDER — MONTELUKAST SODIUM 10 MG/1
TAKE 1 TABLET BY MOUTH ONCE DAILY TABLET, FILM COATED ORAL
Qty: 30 | Refills: 0 | Status: ON HOLD | COMMUNITY
Start: 2019-04-15

## 2023-03-07 RX ORDER — KETOROLAC TROMETHAMINE 10 MG/1
TAKE 1 TABLET BY MOUTH EVERY 12 HOURS AS NEEDED FOR PAIN TABLET, FILM COATED ORAL
Qty: 10 | Refills: 0 | Status: ON HOLD | COMMUNITY
Start: 2019-12-07

## 2023-03-07 RX ORDER — DOXYCYCLINE 100 MG/1
TAKE 1 CAPSULE BY MOUTH TWICE DAILY FOR 10 DAYS CAPSULE ORAL
Qty: 20 | Refills: 0 | Status: ON HOLD | COMMUNITY
Start: 2019-12-19

## 2023-03-07 RX ORDER — ONDANSETRON 4 MG/1
DISSOLVE 1 TABLET IN MOUTH EVERY 8 HOURS AS NEEDED FOR NAUSEA AND VOMI TABLET, ORALLY DISINTEGRATING ORAL
Qty: 10 | Refills: 0 | Status: ON HOLD | COMMUNITY
Start: 2020-03-08

## 2023-03-07 RX ORDER — BUDESONIDE 3 MG/1
TAKE 3 CAPSULES BY MOUTH IN THE MORNING CAPSULE, COATED PELLETS ORAL
Qty: 60 | Refills: 0 | Status: ACTIVE | COMMUNITY
Start: 2020-05-07

## 2023-03-07 RX ORDER — FAMOTIDINE 40 MG/1
TAKE 1 TABLET BY MOUTH TWICE DAILY AS NEEDED TABLET ORAL
Qty: 60 | Refills: 0 | Status: ACTIVE | COMMUNITY
Start: 2019-10-16

## 2023-03-07 RX ORDER — OLMESARTAN MEDOXOMIL 20 MG/1
TAKE 1 TABLET BY MOUTH ONCE DAILY TABLET, FILM COATED ORAL
Qty: 30 | Refills: 0 | Status: ON HOLD | COMMUNITY
Start: 2020-04-24

## 2023-03-07 RX ORDER — AMLODIPINE BESYLATE 10 MG/1
TAKE 1 TABLET BY MOUTH ONCE DAILY TABLET ORAL
Qty: 90 | Refills: 0 | Status: ON HOLD | COMMUNITY
Start: 2020-02-19

## 2023-03-07 RX ORDER — PREDNISONE 10 MG/1
TAKE 4 TABLETS BY MOUTH ONCE DAILY FOR 2 DAYS THEN 3 TABS ONCE DAILY F TABLET ORAL
Qty: 16 | Refills: 0 | Status: ON HOLD | COMMUNITY
Start: 2019-12-09

## 2023-03-07 RX ORDER — FLUTICASONE PROPIONATE 50 UG/1
USE 2 SPRAY(S) IN EACH NOSTRIL ONCE DAILY SPRAY, METERED NASAL
Qty: 16 | Refills: 0 | Status: ACTIVE | COMMUNITY
Start: 2019-12-07

## 2023-03-07 RX ORDER — LANSOPRAZOLE 30 MG/1
TAKE 1 CAPSULE TWICE DAILY CAPSULE, DELAYED RELEASE ORAL
Qty: 60 | Refills: 0 | Status: ON HOLD | COMMUNITY
Start: 2020-05-28

## 2023-03-07 RX ORDER — DOCUSATE SODIUM 100 MG/1
TAKE 1 CAPSULE BY MOUTH TWICE DAILY AS NEEDED FOR CONSTIPATION CAPSULE, LIQUID FILLED ORAL
Qty: 60 | Refills: 0 | Status: ON HOLD | COMMUNITY
Start: 2020-04-01

## 2023-03-07 RX ORDER — SUCRALFATE 1 G/1
TK 1 T PO BID BEFORE MEALS TABLET ORAL
Qty: 20 | Refills: 0 | Status: ON HOLD | COMMUNITY
Start: 2020-02-25

## 2023-03-07 RX ORDER — OLMESARTAN MEDOXOMIL 40 MG/1
TK 1 T PO ONCE A DAY TABLET ORAL
Qty: 30 | Refills: 0 | Status: ON HOLD | COMMUNITY
Start: 2020-05-11

## 2023-03-07 RX ORDER — CLINDAMYCIN HYDROCHLORIDE 300 MG/1
TAKE 1 CAPSULE BY MOUTH THREE TIMES DAILY CAPSULE ORAL
Qty: 15 | Refills: 0 | Status: ON HOLD | COMMUNITY
Start: 2020-01-08

## 2023-03-07 RX ORDER — HYDROCHLOROTHIAZIDE 12.5 MG/1
TAKE 1 TABLET BY MOUTH ONCE DAILY TABLET ORAL
Qty: 30 | Refills: 0 | Status: ON HOLD | COMMUNITY
Start: 2019-10-16

## 2023-03-07 RX ORDER — LEVOCETIRIZINE DIHYDROCHLORIDE 5 MG/1
TAKE 1 TABLET BY MOUTH AT BEDTIME TABLET ORAL
Qty: 30 | Refills: 0 | Status: ON HOLD | COMMUNITY
Start: 2018-08-13

## 2023-03-07 RX ORDER — CIPROFLOXACIN HYDROCHLORIDE 500 MG/1
TAKE 1 TABLET BY MOUTH TWICE DAILY TABLET, FILM COATED ORAL
Qty: 20 | Refills: 0 | Status: ON HOLD | COMMUNITY
Start: 2019-10-16

## 2023-03-07 RX ORDER — PANTOPRAZOLE SODIUM 40 MG/1
TAKE 1 TABLET BY MOUTH ONCE DAILY IF SYMPTOMS PERSIST AFTER 1 WEEK TAK TABLET, DELAYED RELEASE ORAL
Qty: 53 | Refills: 0 | Status: ON HOLD | COMMUNITY
Start: 2020-02-28

## 2023-03-07 RX ORDER — DEXLANSOPRAZOLE 30 MG/1
TAKE 1 CAPSULE DAILY EVERY MORNING BEFORE BREAKFAST CAPSULE, DELAYED RELEASE ORAL
Refills: 3 | Status: ON HOLD | COMMUNITY

## 2023-03-07 RX ORDER — CLOTRIMAZOLE AND BETAMETHASONE DIPROPIONATE 10; .5 MG/G; MG/G
APPLY CREAM TO AFFECTED AREA TWICE DAILY FOR 7 DAYS CREAM TOPICAL
Qty: 15 | Refills: 0 | Status: ON HOLD | COMMUNITY
Start: 2019-12-09

## 2023-03-07 RX ORDER — AMLODIPINE BESYLATE 5 MG/1
TK 1 T PO QD TABLET ORAL
Qty: 30 | Refills: 0 | Status: ON HOLD | COMMUNITY
Start: 2020-01-05

## 2023-03-07 RX ORDER — CETIRIZINE HYDROCHLORIDE 10 MG/1
TAKE 1 TABLET BY MOUTH ONCE DAILY TABLET, FILM COATED ORAL
Qty: 14 | Refills: 0 | Status: ACTIVE | COMMUNITY
Start: 2019-12-07

## 2023-03-07 NOTE — HPI-TODAY'S VISIT:
Ms. 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  She brings in her records from [facility] of approximately 40 pages of notes.  Recent colonoscopy with Dr. Kevin 10/18/2022 revealed stool in the right colon which was partially cleared with fair visualization, single aphthous erosion in the ascending colon, normal terminal ileum, 5 polyps ranging in size from 5 to 6 mm removed in the rectum, small internal hemorrhoids and random biopsies were obtained.  The polyps were all hyperplastic polyps.  Small bowel biopsies revealed no abnormalities and random colon biopsies revealed no abnormalities including negative for microscopic colitis.  She underwent EGD with Dr. Kevin May 2020 which revealed a normal esophagus, patchy mild nonerosive gastritis, single small diverticulum in the second portion duodenum otherwise normal duodenum.  She also underwent colonoscopy with Dr. Flanagan May 2020 which revealed a normal terminal ileum, few aphthous erosions at the IC valve and ascending colon, 8 mm polyp in the distal ascending colon, otherwise normal colon.  Random colon biopsies were obtained.  Labs from September 2022 revealed a normal CBC, CMP and lipase.  She was started on 8 weeks of budesonide and is currently on her seventh week at 3 mg.  Her diarrhea improved from 7 stools a day to 2 semiformed bowel movements daily.  There is no blood in the stool.  She still has some lower abdominal pain.  Her new complaint is burning of her tongue.  She denies any oral thrush.  She was seen by ENT and was told the evaluation was negative for yeast.  She tries avoid spicy foods.  She was previously on Prevacid and famotidine.  She now only uses famotidine as needed.  Previous colonoscopy with Dr. Flanagan in 2020 did reveal indeterminate colitis and reportedly was treated with budesonide at that time.  I believe she had elevated gastrin levels and fecal calprotectin.  She also had delayed gastric emptying on gastric emptying study in the past.  She states during exacerbations of her abdominal symptoms, her heart will race and she will have kidney issues. She has had numerous ER visit, most recently in December at [facility].

## 2023-03-07 NOTE — HPI-OTHER HISTORIES
She was previously seen in the office in May 2020 for a second opinion regarding chronic GI symptoms of nausea, regurgitation, vomiting, abdominal pain, and constipation. Previous 4-hour gastric emptying study suggested gastroparesis. Prior EGD was fairly unremarkable but colonoscopy did reveal focal active colitis possibly related to infectious enteritis or NSAID use, and removal of a serrated adenoma. US and CT were unremarkable but gastrin and chromogranin levels were elevated concerning for underlying gastrinoma. She was recommended somatostatin receptor-based imaging study and/or EUS. Functional dyspepsia and irritable bowel syndrome were within the differential. Various options were discussed, including consideration for a trial of Motegrity, smart pill to assess whole gut motility, EUS, and capsule endoscopy. She was encouraged to follow-up with a GI who accepted her insurance with consideration for a referral to a tertiary care center. She ultimately returned to [facility] following that last visit due to insurance barriers. She has undergone multiple imaging studies, repeat colonoscopy, and stool studies. She states she experienced a symptom exacerbation in July and underwent a colonoscopy in October which showed inflammation. She was started on budesonide and the symptoms resolved, but have recurred. She does not have any knowledge of a diagnosis of microscopic or collagenous colitis, repeatedly stating "they cannot figure out where the inflammation is coming from." She complains of mid to lower abdominal cramping and diffuse bloating. She is having 4-5 loose stools per day with associated mucus but no red blood. Her PCP started her on a repeat 4-week course of budesonide about two days ago and her symptoms have already improved. Her abdominal pain has lessened and stool frequency has reduced to 2x per day. She has been on multiple rounds of antibiotics with resultant issues with flush; this is currently being managed by ENT. She is also followed by urology for recurrent UTIs.

## 2023-05-17 ENCOUNTER — OFFICE VISIT (OUTPATIENT)
Dept: URBAN - METROPOLITAN AREA CLINIC 113 | Facility: CLINIC | Age: 48
End: 2023-05-17
Payer: COMMERCIAL

## 2023-05-17 VITALS
WEIGHT: 170 LBS | HEIGHT: 68 IN | RESPIRATION RATE: 14 BRPM | SYSTOLIC BLOOD PRESSURE: 136 MMHG | TEMPERATURE: 97.1 F | HEART RATE: 93 BPM | BODY MASS INDEX: 25.76 KG/M2 | DIASTOLIC BLOOD PRESSURE: 94 MMHG

## 2023-05-17 DIAGNOSIS — K58.0 IRRITABLE BOWEL SYNDROME WITH DIARRHEA: ICD-10-CM

## 2023-05-17 DIAGNOSIS — R19.7 DIARRHEA: ICD-10-CM

## 2023-05-17 DIAGNOSIS — K59.1 FUNCTIONAL DIARRHEA: ICD-10-CM

## 2023-05-17 DIAGNOSIS — K52.3 INDETERMINATE COLITIS: ICD-10-CM

## 2023-05-17 DIAGNOSIS — R10.13 DYSPEPSIA: ICD-10-CM

## 2023-05-17 DIAGNOSIS — K30 DELAYED GASTRIC EMPTYING: ICD-10-CM

## 2023-05-17 DIAGNOSIS — R10.84 GENERALIZED ABDOMINAL PAIN: ICD-10-CM

## 2023-05-17 PROCEDURE — 99214 OFFICE O/P EST MOD 30 MIN: CPT | Performed by: INTERNAL MEDICINE

## 2023-05-17 RX ORDER — SULFAMETHOXAZOLE AND TRIMETHOPRIM 800; 160 MG/1; MG/1
TAKE 1 TABLET BY MOUTH TWICE DAILY TABLET ORAL
Qty: 6 | Refills: 0 | Status: ON HOLD | COMMUNITY
Start: 2019-05-24

## 2023-05-17 RX ORDER — OLMESARTAN MEDOXOMIL 40 MG/1
TK 2 TS PO D FOR 60 DAYS TABLET ORAL
Qty: 30 | Refills: 0 | Status: ON HOLD | COMMUNITY
Start: 2020-05-15

## 2023-05-17 RX ORDER — MONTELUKAST SODIUM 10 MG/1
TAKE 1 TABLET BY MOUTH ONCE DAILY TABLET, FILM COATED ORAL
Qty: 30 | Refills: 0 | Status: ON HOLD | COMMUNITY
Start: 2019-04-15

## 2023-05-17 RX ORDER — DOXYCYCLINE 100 MG/1
TAKE 1 CAPSULE BY MOUTH TWICE DAILY FOR 10 DAYS CAPSULE ORAL
Qty: 20 | Refills: 0 | Status: ON HOLD | COMMUNITY
Start: 2019-12-19

## 2023-05-17 RX ORDER — HYOSCYAMINE SULFATE 0.12 MG/1
1 TABLET UNDER THE TONGUE AND ALLOW TO DISSOLVE  AS NEEDED TABLET, ORALLY DISINTEGRATING ORAL
Qty: 60 | Refills: 2 | OUTPATIENT
Start: 2023-05-17 | End: 2023-11-12

## 2023-05-17 RX ORDER — HYDROCODONE BITARTRATE AND ACETAMINOPHEN 5; 325 MG/1; MG/1
TK 1 T PO Q 6 H PRN FOR PAIN TABLET ORAL
Qty: 10 | Refills: 0 | Status: ON HOLD | COMMUNITY
Start: 2020-02-25

## 2023-05-17 RX ORDER — OMEPRAZOLE 20 MG/1
TAKE 1 CAPSULE BY MOUTH ONCE DAILY CAPSULE, DELAYED RELEASE ORAL
Qty: 30 | Refills: 0 | Status: ON HOLD | COMMUNITY
Start: 2018-09-10

## 2023-05-17 RX ORDER — DOCUSATE SODIUM 100 MG/1
TAKE 1 CAPSULE BY MOUTH TWICE DAILY AS NEEDED FOR CONSTIPATION CAPSULE, LIQUID FILLED ORAL
Qty: 60 | Refills: 0 | Status: ON HOLD | COMMUNITY
Start: 2020-04-01

## 2023-05-17 RX ORDER — OMEPRAZOLE 40 MG/1
TK 1 C PO BID CAPSULE, DELAYED RELEASE ORAL
Qty: 180 | Refills: 0 | Status: ON HOLD | COMMUNITY
Start: 2019-03-13

## 2023-05-17 RX ORDER — BUTALBITAL, ACETAMINOPHEN, AND CAFFEINE 50; 325; 40 MG/1; MG/1; MG/1
TK 1-2 TS PO Q 6 H PRF HA TABLET ORAL
Qty: 20 | Refills: 0 | Status: ON HOLD | COMMUNITY
Start: 2020-04-22

## 2023-05-17 RX ORDER — CLARITHROMYCIN 500 MG/1
TAKE 1 TABLET BY MOUTH TWICE DAILY TABLET, FILM COATED ORAL
Qty: 20 | Refills: 0 | Status: ON HOLD | COMMUNITY
Start: 2020-03-02

## 2023-05-17 RX ORDER — POLYETHYLENE GLYCOL 3350, SODIUM CHLORIDE, SODIUM BICARBONATE AND POTASSIUM CHLORIDE WITH LEMON FLAVOR 420; 11.2; 5.72; 1.48 G/4L; G/4L; G/4L; G/4L
MIX AND DRINK UTD POWDER, FOR SOLUTION ORAL
Qty: 4000 | Refills: 0 | Status: ON HOLD | COMMUNITY
Start: 2020-04-27

## 2023-05-17 RX ORDER — DEXLANSOPRAZOLE 30 MG/1
TAKE 1 CAPSULE DAILY EVERY MORNING BEFORE BREAKFAST CAPSULE, DELAYED RELEASE ORAL
Refills: 3 | Status: ON HOLD | COMMUNITY

## 2023-05-17 RX ORDER — CIPROFLOXACIN HYDROCHLORIDE 500 MG/1
TAKE 1 TABLET BY MOUTH TWICE DAILY TABLET, FILM COATED ORAL
Qty: 20 | Refills: 0 | Status: ON HOLD | COMMUNITY
Start: 2019-10-16

## 2023-05-17 RX ORDER — HYDROCHLOROTHIAZIDE 25 MG/1
TAKE 1 TABLET BY MOUTH ONCE DAILY TABLET ORAL
Qty: 30 | Refills: 0 | Status: ACTIVE | COMMUNITY
Start: 2019-04-15

## 2023-05-17 RX ORDER — PANTOPRAZOLE SODIUM 40 MG/1
TAKE 1 TABLET BY MOUTH ONCE DAILY IF SYMPTOMS PERSIST AFTER 1 WEEK TAK TABLET, DELAYED RELEASE ORAL
Qty: 53 | Refills: 0 | Status: ON HOLD | COMMUNITY
Start: 2020-02-28

## 2023-05-17 RX ORDER — LEVOCETIRIZINE DIHYDROCHLORIDE 5 MG/1
TAKE 1 TABLET BY MOUTH AT BEDTIME TABLET ORAL
Qty: 30 | Refills: 0 | Status: ON HOLD | COMMUNITY
Start: 2018-08-13

## 2023-05-17 RX ORDER — LANSOPRAZOLE 30 MG/1
TAKE 1 CAPSULE TWICE DAILY CAPSULE, DELAYED RELEASE ORAL
Qty: 60 | Refills: 0 | Status: ON HOLD | COMMUNITY
Start: 2020-05-28

## 2023-05-17 RX ORDER — CLOTRIMAZOLE AND BETAMETHASONE DIPROPIONATE 10; .5 MG/G; MG/G
APPLY CREAM TO AFFECTED AREA TWICE DAILY FOR 7 DAYS CREAM TOPICAL
Qty: 15 | Refills: 0 | Status: ON HOLD | COMMUNITY
Start: 2019-12-09

## 2023-05-17 RX ORDER — ESOMEPRAZOLE MAGNESIUM 40 MG/1
TAKE 1 CAPSULE BY MOUTH ONCE DAILY CAPSULE, DELAYED RELEASE PELLETS ORAL
Qty: 30 | Refills: 0 | Status: ON HOLD | COMMUNITY
Start: 2020-01-19

## 2023-05-17 RX ORDER — CEPHALEXIN 500 MG/1
TK ONE C PO QID FOR 7 DAYS CAPSULE ORAL
Qty: 28 | Refills: 0 | Status: ON HOLD | COMMUNITY
Start: 2020-05-24

## 2023-05-17 RX ORDER — POTASSIUM CHLORIDE 750 MG/1
TAKE 1 TABLET BY MOUTH ONCE DAILY TABLET, EXTENDED RELEASE ORAL
Qty: 10 | Refills: 0 | Status: ON HOLD | COMMUNITY
Start: 2020-04-01

## 2023-05-17 RX ORDER — HYDROCHLOROTHIAZIDE 12.5 MG/1
TAKE 1 TABLET BY MOUTH ONCE DAILY TABLET ORAL
Qty: 30 | Refills: 0 | Status: ON HOLD | COMMUNITY
Start: 2019-10-16

## 2023-05-17 RX ORDER — FLUTICASONE PROPIONATE 50 UG/1
USE 2 SPRAY(S) IN EACH NOSTRIL ONCE DAILY SPRAY, METERED NASAL
Qty: 16 | Refills: 0 | Status: ACTIVE | COMMUNITY
Start: 2019-12-07

## 2023-05-17 RX ORDER — BUDESONIDE 3 MG/1
TAKE 3 CAPSULES BY MOUTH IN THE MORNING CAPSULE, COATED PELLETS ORAL
Status: ON HOLD | COMMUNITY
Start: 2020-05-07

## 2023-05-17 RX ORDER — AMLODIPINE BESYLATE 10 MG/1
TAKE 1 TABLET BY MOUTH ONCE DAILY TABLET ORAL
Qty: 90 | Refills: 0 | Status: ON HOLD | COMMUNITY
Start: 2020-02-19

## 2023-05-17 RX ORDER — LANSOPRAZOLE 30 MG/1
1 CAPSULE BEFORE A MEAL CAPSULE, DELAYED RELEASE ORAL ONCE A DAY
Status: ACTIVE | COMMUNITY

## 2023-05-17 RX ORDER — PREDNISONE 10 MG/1
TAKE 4 TABLETS BY MOUTH ONCE DAILY FOR 2 DAYS THEN 3 TABS ONCE DAILY F TABLET ORAL
Qty: 16 | Refills: 0 | Status: ON HOLD | COMMUNITY
Start: 2019-12-09

## 2023-05-17 RX ORDER — METOCLOPRAMIDE 10 MG/1
TAKE 1 TABLET BY MOUTH EVERY 6 TO 8 HOURS WITH MEALS TABLET ORAL
Qty: 120 | Refills: 0 | Status: ON HOLD | COMMUNITY
Start: 2020-03-28

## 2023-05-17 RX ORDER — SUMATRIPTAN SUCCINATE 50 MG/1
TAKE 1 TABLET BY MOUTH ONCE DAILY AS NEEDED FOR MIGRAINE HEADACHE TABLET, FILM COATED ORAL
Qty: 9 | Refills: 0 | Status: ON HOLD | COMMUNITY
Start: 2019-10-16

## 2023-05-17 RX ORDER — PROMETHAZINE HYDROCHLORIDE 25 MG/1
TAKE 1 TABLET BY MOUTH EVERY 6 HOURS AS NEEDED FOR NAUSEA FOR UP TO 7 TABLET ORAL
Qty: 20 | Refills: 0 | Status: ON HOLD | COMMUNITY
Start: 2020-04-12

## 2023-05-17 RX ORDER — KETOROLAC TROMETHAMINE 10 MG/1
TAKE 1 TABLET BY MOUTH EVERY 12 HOURS AS NEEDED FOR PAIN TABLET, FILM COATED ORAL
Qty: 10 | Refills: 0 | Status: ON HOLD | COMMUNITY
Start: 2019-12-07

## 2023-05-17 RX ORDER — WHEAT DEXTRIN 3 G/3.5 G
1 TABLESPOON POWDER (GRAM) ORAL DAILY
Qty: 90 | Refills: 1 | OUTPATIENT
Start: 2023-05-17 | End: 2023-11-12

## 2023-05-17 RX ORDER — DICYCLOMINE HYDROCHLORIDE 20 MG/1
TAKE 1 TABLET PO EVERY 6 HOURS PRN TABLET ORAL
Qty: 20 | Refills: 0 | Status: ON HOLD | COMMUNITY
Start: 2020-05-19

## 2023-05-17 RX ORDER — ONDANSETRON 4 MG/1
DISSOLVE 1 TABLET IN MOUTH EVERY 8 HOURS AS NEEDED FOR NAUSEA AND VOMI TABLET, ORALLY DISINTEGRATING ORAL
Qty: 10 | Refills: 0 | Status: ON HOLD | COMMUNITY
Start: 2020-03-08

## 2023-05-17 RX ORDER — LISINOPRIL 20 MG/1
TK 1 T PO QD TABLET ORAL
Qty: 90 | Refills: 0 | Status: ON HOLD | COMMUNITY
Start: 2020-04-08

## 2023-05-17 RX ORDER — SUCRALFATE 1 G/10ML
TK 10ML PO QID SUSPENSION ORAL
Qty: 420 | Refills: 0 | Status: ON HOLD | COMMUNITY
Start: 2020-03-27

## 2023-05-17 RX ORDER — CLINDAMYCIN HYDROCHLORIDE 300 MG/1
TAKE 1 CAPSULE BY MOUTH THREE TIMES DAILY CAPSULE ORAL
Qty: 15 | Refills: 0 | Status: ON HOLD | COMMUNITY
Start: 2020-01-08

## 2023-05-17 RX ORDER — OLMESARTAN MEDOXOMIL 40 MG/1
TK 1 T PO ONCE A DAY TABLET ORAL
Qty: 30 | Refills: 0 | Status: ON HOLD | COMMUNITY
Start: 2020-05-11

## 2023-05-17 RX ORDER — AMOXICILLIN 500 MG/1
TAKE 1 CAPSULE BY MOUTH EVERY 12 HOURS CAPSULE ORAL
Qty: 20 | Refills: 0 | Status: ON HOLD | COMMUNITY
Start: 2019-12-07

## 2023-05-17 RX ORDER — POTASSIUM CHLORIDE 20 MEQ/1
TAKE 1 TABLET BY MOUTH TWICE DAILY TABLET, EXTENDED RELEASE ORAL
Qty: 6 | Refills: 0 | Status: ON HOLD | COMMUNITY
Start: 2020-01-04

## 2023-05-17 RX ORDER — SUMATRIPTAN SUCCINATE 25 MG/1
TAKE 1 TABLET BY MOUTH EVERY 2 HOURS AS NEEDED FOR MIGRAINE TABLET ORAL
Qty: 9 | Refills: 0 | Status: ON HOLD | COMMUNITY
Start: 2020-02-19

## 2023-05-17 RX ORDER — FAMOTIDINE 40 MG/1
TAKE 1 TABLET BY MOUTH TWICE DAILY AS NEEDED TABLET ORAL
Qty: 60 | Refills: 0 | Status: ACTIVE | COMMUNITY
Start: 2019-10-16

## 2023-05-17 RX ORDER — AMLODIPINE BESYLATE 5 MG/1
TK 1 T PO QD TABLET ORAL
Qty: 30 | Refills: 0 | Status: ON HOLD | COMMUNITY
Start: 2020-01-05

## 2023-05-17 RX ORDER — SUCRALFATE 1 G/1
TK 1 T PO BID BEFORE MEALS TABLET ORAL
Qty: 20 | Refills: 0 | Status: ON HOLD | COMMUNITY
Start: 2020-02-25

## 2023-05-17 RX ORDER — CETIRIZINE HYDROCHLORIDE 10 MG/1
TAKE 1 TABLET BY MOUTH ONCE DAILY TABLET, FILM COATED ORAL
Qty: 14 | Refills: 0 | Status: ACTIVE | COMMUNITY
Start: 2019-12-07

## 2023-05-17 RX ORDER — OLMESARTAN MEDOXOMIL 20 MG/1
TAKE 1 TABLET BY MOUTH ONCE DAILY TABLET, FILM COATED ORAL
Qty: 30 | Refills: 0 | Status: ON HOLD | COMMUNITY
Start: 2020-04-24

## 2023-05-17 NOTE — HPI-OTHER HISTORIES
She was previously seen in the office in May 2020 for a second opinion regarding chronic GI symptoms of nausea, regurgitation, vomiting, abdominal pain, and constipation. Previous 4-hour gastric emptying study suggested gastroparesis. Prior EGD was fairly unremarkable but colonoscopy did reveal focal active colitis possibly related to infectious enteritis or NSAID use, and removal of a serrated adenoma. US and CT were unremarkable but gastrin and chromogranin levels were elevated concerning for underlying gastrinoma. She was recommended somatostatin receptor-based imaging study and/or EUS. Functional dyspepsia and irritable bowel syndrome were within the differential. Various options were discussed, including consideration for a trial of Motegrity, smart pill to assess whole gut motility, EUS, and capsule endoscopy. She was encouraged to follow-up with a GI who accepted her insurance with consideration for a referral to a tertiary care center. She ultimately returned to [facility] following that last visit due to insurance barriers. She has undergone multiple imaging studies, repeat colonoscopy, and stool studies. She states she experienced a symptom exacerbation in July and underwent a colonoscopy in October which showed inflammation. She was started on budesonide and the symptoms resolved, but have recurred. She does not have any knowledge of a diagnosis of microscopic or collagenous colitis, repeatedly stating "they cannot figure out where the inflammation is coming from." She complains of mid to lower abdominal cramping and diffuse bloating. She is having 4-5 loose stools per day with associated mucus but no red blood. Her PCP started her on a repeat 4-week course of budesonide about two days ago and her symptoms have already improved. Her abdominal pain has lessened and stool frequency has reduced to 2x per day. She has been on multiple rounds of antibiotics with resultant issues with flush; this is currently being managed by ENT. She is also followed by urology for recurrent UTIs. Universal Safety Interventions

## 2023-05-17 NOTE — HPI-TODAY'S VISIT:
Ms. 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    She does not for her last office visit.  She continues on 3 times a day.  There is no blood in the stool.  Her abdominal pain is mainly in the lower abdomen and seems to be worse with BM.   She has not tried Levsin or bentyl.  She denies any NSAID use or chronic constipation.  She did see ENT which did not find anything significant.     [facility] were reviewed with approximately 40 pages of notes.  Recent colonoscopy with Dr. Kevin 10/18/2022 revealed stool in the right colon which was partially cleared with fair visualization, single aphthous erosion in the ascending colon, normal terminal ileum, 5 polyps ranging in size from 5 to 6 mm removed in the rectum, small internal hemorrhoids and random biopsies were obtained.  The polyps were all hyperplastic polyps.  Small bowel biopsies revealed no abnormalities and random colon biopsies revealed no abnormalities including negative for microscopic colitis.  She underwent EGD with Dr. Kevin May 2020 which revealed a normal esophagus, patchy mild nonerosive gastritis, single small diverticulum in the second portion duodenum otherwise normal duodenum.  She also underwent colonoscopy with Dr. Flanagan May 2020 which revealed a normal terminal ileum, few aphthous erosions at the IC valve and ascending colon, 8 mm polyp in the distal ascending colon, otherwise normal colon.  Random colon biopsies were obtained.  Labs from September 2022 revealed a normal CBC, CMP and lipase.  She was started on 8 weeks of budesonide.  Her diarrhea improved from 7 stools a day to 2 semiformed bowel movements daily.   Previous colonoscopy with Dr. Flanagan in 2020 did reveal indeterminate colitis and reportedly was treated with budesonide at that time.  I believe she had elevated gastrin levels and fecal calprotectin.  She also had delayed gastric emptying on gastric emptying study in the past.

## 2023-05-22 ENCOUNTER — TELEPHONE ENCOUNTER (OUTPATIENT)
Dept: URBAN - METROPOLITAN AREA CLINIC 113 | Facility: CLINIC | Age: 48
End: 2023-05-22

## 2023-05-22 LAB
A/G RATIO: 1.2
ABSOLUTE BASOPHILS: 18
ABSOLUTE EOSINOPHILS: 129
ABSOLUTE LYMPHOCYTES: 1417
ABSOLUTE MONOCYTES: 336
ABSOLUTE NEUTROPHILS: 2700
ALBUMIN: 4.4
ALKALINE PHOSPHATASE: 65
ALT (SGPT): 15
AST (SGOT): 15
BASOPHILS: 0.4
BILIRUBIN, TOTAL: 0.4
BUN/CREATININE RATIO: 11
BUN: 12
C-REACTIVE PROTEIN, QUANT: 6.3
CALCIUM: 9.7
CARBON DIOXIDE, TOTAL: 24
CHLORIDE: 104
CREATININE: 1.07
EGFR: 65
EOSINOPHILS: 2.8
GLOBULIN, TOTAL: 3.6
GLUCOSE: 86
HEMATOCRIT: 39.4
HEMOGLOBIN: 12.9
LYMPHOCYTES: 30.8
MCH: 29
MCHC: 32.7
MCV: 88.5
MONOCYTES: 7.3
MPV: 9.3
NEUTROPHILS: 58.7
PLATELET COUNT: 296
POTASSIUM: 3.4
PROTEIN, TOTAL: 8
RDW: 12.2
RED BLOOD CELL COUNT: 4.45
SED RATE BY MODIFIED: 9
SODIUM: 140
WHITE BLOOD CELL COUNT: 4.6

## 2023-05-26 LAB — CALPROTECTIN, FECAL: 91

## 2023-05-31 ENCOUNTER — TELEPHONE ENCOUNTER (OUTPATIENT)
Dept: URBAN - METROPOLITAN AREA CLINIC 113 | Facility: CLINIC | Age: 48
End: 2023-05-31

## 2023-06-28 ENCOUNTER — TELEPHONE ENCOUNTER (OUTPATIENT)
Dept: URBAN - METROPOLITAN AREA CLINIC 113 | Facility: CLINIC | Age: 48
End: 2023-06-28

## 2023-06-28 RX ORDER — COLESTIPOL HYDROCHLORIDE 1 G/1
1 TABLETS TABLET, FILM COATED ORAL ONCE A DAY
Qty: 90 TABLET | Refills: 3 | OUTPATIENT
Start: 2023-07-03

## 2023-07-07 ENCOUNTER — OFFICE VISIT (OUTPATIENT)
Dept: URBAN - METROPOLITAN AREA CLINIC 113 | Facility: CLINIC | Age: 48
End: 2023-07-07

## 2023-07-24 ENCOUNTER — OFFICE VISIT (OUTPATIENT)
Dept: URBAN - METROPOLITAN AREA CLINIC 113 | Facility: CLINIC | Age: 48
End: 2023-07-24
Payer: COMMERCIAL

## 2023-07-24 VITALS
SYSTOLIC BLOOD PRESSURE: 116 MMHG | HEART RATE: 88 BPM | RESPIRATION RATE: 18 BRPM | WEIGHT: 168.4 LBS | DIASTOLIC BLOOD PRESSURE: 81 MMHG | BODY MASS INDEX: 25.52 KG/M2 | HEIGHT: 68 IN | TEMPERATURE: 97.1 F

## 2023-07-24 DIAGNOSIS — R10.84 GENERALIZED ABDOMINAL PAIN: ICD-10-CM

## 2023-07-24 DIAGNOSIS — K58.0 IRRITABLE BOWEL SYNDROME WITH DIARRHEA: ICD-10-CM

## 2023-07-24 DIAGNOSIS — K59.1 FUNCTIONAL DIARRHEA: ICD-10-CM

## 2023-07-24 DIAGNOSIS — K52.3 INDETERMINATE COLITIS: ICD-10-CM

## 2023-07-24 DIAGNOSIS — K30 DELAYED GASTRIC EMPTYING: ICD-10-CM

## 2023-07-24 DIAGNOSIS — R19.7 DIARRHEA: ICD-10-CM

## 2023-07-24 DIAGNOSIS — R10.13 DYSPEPSIA: ICD-10-CM

## 2023-07-24 PROCEDURE — 99213 OFFICE O/P EST LOW 20 MIN: CPT | Performed by: INTERNAL MEDICINE

## 2023-07-24 RX ORDER — BUDESONIDE 3 MG/1
TAKE 3 CAPSULES BY MOUTH IN THE MORNING CAPSULE, COATED PELLETS ORAL
Status: ON HOLD | COMMUNITY
Start: 2020-05-07

## 2023-07-24 RX ORDER — POLYETHYLENE GLYCOL 3350, SODIUM CHLORIDE, SODIUM BICARBONATE AND POTASSIUM CHLORIDE WITH LEMON FLAVOR 420; 11.2; 5.72; 1.48 G/4L; G/4L; G/4L; G/4L
MIX AND DRINK UTD POWDER, FOR SOLUTION ORAL
Qty: 4000 | Refills: 0 | Status: ON HOLD | COMMUNITY
Start: 2020-04-27

## 2023-07-24 RX ORDER — LISINOPRIL 20 MG/1
TK 1 T PO QD TABLET ORAL
Qty: 90 | Refills: 0 | Status: ON HOLD | COMMUNITY
Start: 2020-04-08

## 2023-07-24 RX ORDER — KETOROLAC TROMETHAMINE 10 MG/1
TAKE 1 TABLET BY MOUTH EVERY 12 HOURS AS NEEDED FOR PAIN TABLET, FILM COATED ORAL
Qty: 10 | Refills: 0 | Status: ON HOLD | COMMUNITY
Start: 2019-12-07

## 2023-07-24 RX ORDER — HYOSCYAMINE SULFATE 0.12 MG/1
1 TABLET UNDER THE TONGUE AND ALLOW TO DISSOLVE  AS NEEDED TABLET, ORALLY DISINTEGRATING ORAL
Qty: 60 | Refills: 2 | Status: ACTIVE | COMMUNITY
Start: 2023-05-17 | End: 2023-11-12

## 2023-07-24 RX ORDER — ESOMEPRAZOLE MAGNESIUM 40 MG/1
TAKE 1 CAPSULE BY MOUTH ONCE DAILY CAPSULE, DELAYED RELEASE PELLETS ORAL
Qty: 30 | Refills: 0 | Status: ON HOLD | COMMUNITY
Start: 2020-01-19

## 2023-07-24 RX ORDER — OMEPRAZOLE 20 MG/1
TAKE 1 CAPSULE BY MOUTH ONCE DAILY CAPSULE, DELAYED RELEASE ORAL
Qty: 30 | Refills: 0 | Status: ON HOLD | COMMUNITY
Start: 2018-09-10

## 2023-07-24 RX ORDER — CLARITHROMYCIN 500 MG/1
TAKE 1 TABLET BY MOUTH TWICE DAILY TABLET, FILM COATED ORAL
Qty: 20 | Refills: 0 | Status: ON HOLD | COMMUNITY
Start: 2020-03-02

## 2023-07-24 RX ORDER — HYDROCODONE BITARTRATE AND ACETAMINOPHEN 5; 325 MG/1; MG/1
TK 1 T PO Q 6 H PRN FOR PAIN TABLET ORAL
Qty: 10 | Refills: 0 | Status: ON HOLD | COMMUNITY
Start: 2020-02-25

## 2023-07-24 RX ORDER — AMLODIPINE BESYLATE 10 MG/1
TAKE 1 TABLET BY MOUTH ONCE DAILY TABLET ORAL
Qty: 90 | Refills: 0 | Status: ON HOLD | COMMUNITY
Start: 2020-02-19

## 2023-07-24 RX ORDER — COLESTIPOL HYDROCHLORIDE 1 G/1
1 TABLETS TABLET, FILM COATED ORAL ONCE A DAY
Qty: 90 TABLET | Refills: 3 | Status: ON HOLD | COMMUNITY
Start: 2023-07-03

## 2023-07-24 RX ORDER — POTASSIUM CHLORIDE 750 MG/1
TAKE 1 TABLET BY MOUTH ONCE DAILY TABLET, EXTENDED RELEASE ORAL
Qty: 10 | Refills: 0 | Status: ON HOLD | COMMUNITY
Start: 2020-04-01

## 2023-07-24 RX ORDER — WHEAT DEXTRIN 3 G/3.5 G
1 TABLESPOON POWDER (GRAM) ORAL DAILY
Qty: 90 | Refills: 1 | Status: ACTIVE | COMMUNITY
Start: 2023-05-17 | End: 2023-11-12

## 2023-07-24 RX ORDER — CEPHALEXIN 500 MG/1
TK ONE C PO QID FOR 7 DAYS CAPSULE ORAL
Qty: 28 | Refills: 0 | Status: ON HOLD | COMMUNITY
Start: 2020-05-24

## 2023-07-24 RX ORDER — DEXLANSOPRAZOLE 30 MG/1
TAKE 1 CAPSULE DAILY EVERY MORNING BEFORE BREAKFAST CAPSULE, DELAYED RELEASE ORAL
Refills: 3 | Status: ON HOLD | COMMUNITY

## 2023-07-24 RX ORDER — LANSOPRAZOLE 30 MG/1
TAKE 1 CAPSULE TWICE DAILY CAPSULE, DELAYED RELEASE ORAL
Qty: 60 | Refills: 0 | Status: ON HOLD | COMMUNITY
Start: 2020-05-28

## 2023-07-24 RX ORDER — CLOTRIMAZOLE AND BETAMETHASONE DIPROPIONATE 10; .5 MG/G; MG/G
APPLY CREAM TO AFFECTED AREA TWICE DAILY FOR 7 DAYS CREAM TOPICAL
Qty: 15 | Refills: 0 | Status: ON HOLD | COMMUNITY
Start: 2019-12-09

## 2023-07-24 RX ORDER — PROMETHAZINE HYDROCHLORIDE 25 MG/1
TAKE 1 TABLET BY MOUTH EVERY 6 HOURS AS NEEDED FOR NAUSEA FOR UP TO 7 TABLET ORAL
Qty: 20 | Refills: 0 | Status: ON HOLD | COMMUNITY
Start: 2020-04-12

## 2023-07-24 RX ORDER — WHEAT DEXTRIN 3 G/3.5 G
1 TABLESPOON POWDER (GRAM) ORAL DAILY
Qty: 90 | Refills: 1 | OUTPATIENT

## 2023-07-24 RX ORDER — HYOSCYAMINE SULFATE 0.12 MG/1
1 TABLET UNDER THE TONGUE AND ALLOW TO DISSOLVE  AS NEEDED TABLET, ORALLY DISINTEGRATING ORAL
Qty: 60 | Refills: 2 | OUTPATIENT

## 2023-07-24 RX ORDER — HYDROCHLOROTHIAZIDE 12.5 MG/1
TAKE 1 TABLET BY MOUTH ONCE DAILY TABLET ORAL
Qty: 30 | Refills: 0 | Status: ON HOLD | COMMUNITY
Start: 2019-10-16

## 2023-07-24 RX ORDER — AMOXICILLIN 500 MG/1
TAKE 1 CAPSULE BY MOUTH EVERY 12 HOURS CAPSULE ORAL
Qty: 20 | Refills: 0 | Status: ON HOLD | COMMUNITY
Start: 2019-12-07

## 2023-07-24 RX ORDER — LEVOCETIRIZINE DIHYDROCHLORIDE 5 MG/1
TAKE 1 TABLET BY MOUTH AT BEDTIME TABLET ORAL
Qty: 30 | Refills: 0 | Status: ON HOLD | COMMUNITY
Start: 2018-08-13

## 2023-07-24 RX ORDER — MONTELUKAST SODIUM 10 MG/1
TAKE 1 TABLET BY MOUTH ONCE DAILY TABLET, FILM COATED ORAL
Qty: 30 | Refills: 0 | Status: ON HOLD | COMMUNITY
Start: 2019-04-15

## 2023-07-24 RX ORDER — OLMESARTAN MEDOXOMIL 20 MG/1
TAKE 1 TABLET BY MOUTH ONCE DAILY TABLET, FILM COATED ORAL
Qty: 30 | Refills: 0 | Status: ON HOLD | COMMUNITY
Start: 2020-04-24

## 2023-07-24 RX ORDER — FLUTICASONE PROPIONATE 50 UG/1
USE 2 SPRAY(S) IN EACH NOSTRIL ONCE DAILY SPRAY, METERED NASAL
Qty: 16 | Refills: 0 | Status: ACTIVE | COMMUNITY
Start: 2019-12-07

## 2023-07-24 RX ORDER — SUCRALFATE 1 G/10ML
TK 10ML PO QID SUSPENSION ORAL
Qty: 420 | Refills: 0 | Status: ON HOLD | COMMUNITY
Start: 2020-03-27

## 2023-07-24 RX ORDER — ONDANSETRON 4 MG/1
DISSOLVE 1 TABLET IN MOUTH EVERY 8 HOURS AS NEEDED FOR NAUSEA AND VOMI TABLET, ORALLY DISINTEGRATING ORAL
Qty: 10 | Refills: 0 | Status: ON HOLD | COMMUNITY
Start: 2020-03-08

## 2023-07-24 RX ORDER — DOCUSATE SODIUM 100 MG/1
TAKE 1 CAPSULE BY MOUTH TWICE DAILY AS NEEDED FOR CONSTIPATION CAPSULE, LIQUID FILLED ORAL
Qty: 60 | Refills: 0 | Status: ON HOLD | COMMUNITY
Start: 2020-04-01

## 2023-07-24 RX ORDER — SUCRALFATE 1 G/1
TK 1 T PO BID BEFORE MEALS TABLET ORAL
Qty: 20 | Refills: 0 | Status: ON HOLD | COMMUNITY
Start: 2020-02-25

## 2023-07-24 RX ORDER — DICYCLOMINE HYDROCHLORIDE 20 MG/1
TAKE 1 TABLET PO EVERY 6 HOURS PRN TABLET ORAL
Qty: 20 | Refills: 0 | Status: ON HOLD | COMMUNITY
Start: 2020-05-19

## 2023-07-24 RX ORDER — CIPROFLOXACIN HYDROCHLORIDE 500 MG/1
TAKE 1 TABLET BY MOUTH TWICE DAILY TABLET, FILM COATED ORAL
Qty: 20 | Refills: 0 | Status: ON HOLD | COMMUNITY
Start: 2019-10-16

## 2023-07-24 RX ORDER — POTASSIUM CHLORIDE 20 MEQ/1
TAKE 1 TABLET BY MOUTH TWICE DAILY TABLET, EXTENDED RELEASE ORAL
Qty: 6 | Refills: 0 | Status: ON HOLD | COMMUNITY
Start: 2020-01-04

## 2023-07-24 RX ORDER — SULFAMETHOXAZOLE AND TRIMETHOPRIM 800; 160 MG/1; MG/1
TAKE 1 TABLET BY MOUTH TWICE DAILY TABLET ORAL
Qty: 6 | Refills: 0 | Status: ON HOLD | COMMUNITY
Start: 2019-05-24

## 2023-07-24 RX ORDER — OMEPRAZOLE 40 MG/1
TK 1 C PO BID CAPSULE, DELAYED RELEASE ORAL
Qty: 180 | Refills: 0 | Status: ON HOLD | COMMUNITY
Start: 2019-03-13

## 2023-07-24 RX ORDER — OLMESARTAN MEDOXOMIL 40 MG/1
TK 1 T PO ONCE A DAY TABLET ORAL
Qty: 30 | Refills: 0 | Status: ON HOLD | COMMUNITY
Start: 2020-05-11

## 2023-07-24 RX ORDER — OLMESARTAN MEDOXOMIL 40 MG/1
TK 2 TS PO D FOR 60 DAYS TABLET ORAL
Qty: 30 | Refills: 0 | Status: ON HOLD | COMMUNITY
Start: 2020-05-15

## 2023-07-24 RX ORDER — DOXYCYCLINE 100 MG/1
TAKE 1 CAPSULE BY MOUTH TWICE DAILY FOR 10 DAYS CAPSULE ORAL
Qty: 20 | Refills: 0 | Status: ON HOLD | COMMUNITY
Start: 2019-12-19

## 2023-07-24 RX ORDER — CETIRIZINE HYDROCHLORIDE 10 MG/1
TAKE 1 TABLET BY MOUTH ONCE DAILY TABLET, FILM COATED ORAL
Qty: 14 | Refills: 0 | Status: ACTIVE | COMMUNITY
Start: 2019-12-07

## 2023-07-24 RX ORDER — PANTOPRAZOLE SODIUM 40 MG/1
TAKE 1 TABLET BY MOUTH ONCE DAILY IF SYMPTOMS PERSIST AFTER 1 WEEK TAK TABLET, DELAYED RELEASE ORAL
Qty: 53 | Refills: 0 | Status: ON HOLD | COMMUNITY
Start: 2020-02-28

## 2023-07-24 RX ORDER — SUMATRIPTAN SUCCINATE 25 MG/1
TAKE 1 TABLET BY MOUTH EVERY 2 HOURS AS NEEDED FOR MIGRAINE TABLET ORAL
Qty: 9 | Refills: 0 | Status: ON HOLD | COMMUNITY
Start: 2020-02-19

## 2023-07-24 RX ORDER — FAMOTIDINE 40 MG/1
TAKE 1 TABLET BY MOUTH TWICE DAILY AS NEEDED TABLET ORAL
Qty: 60 | Refills: 0 | Status: ACTIVE | COMMUNITY
Start: 2019-10-16

## 2023-07-24 RX ORDER — HYDROCHLOROTHIAZIDE 25 MG/1
TAKE 1 TABLET BY MOUTH ONCE DAILY TABLET ORAL
Qty: 30 | Refills: 0 | Status: ACTIVE | COMMUNITY
Start: 2019-04-15

## 2023-07-24 RX ORDER — BUTALBITAL, ACETAMINOPHEN, AND CAFFEINE 50; 325; 40 MG/1; MG/1; MG/1
TK 1-2 TS PO Q 6 H PRF HA TABLET ORAL
Qty: 20 | Refills: 0 | Status: ON HOLD | COMMUNITY
Start: 2020-04-22

## 2023-07-24 RX ORDER — SUMATRIPTAN SUCCINATE 50 MG/1
TAKE 1 TABLET BY MOUTH ONCE DAILY AS NEEDED FOR MIGRAINE HEADACHE TABLET, FILM COATED ORAL
Qty: 9 | Refills: 0 | Status: ON HOLD | COMMUNITY
Start: 2019-10-16

## 2023-07-24 RX ORDER — PREDNISONE 10 MG/1
TAKE 4 TABLETS BY MOUTH ONCE DAILY FOR 2 DAYS THEN 3 TABS ONCE DAILY F TABLET ORAL
Qty: 16 | Refills: 0 | Status: ON HOLD | COMMUNITY
Start: 2019-12-09

## 2023-07-24 RX ORDER — CLINDAMYCIN HYDROCHLORIDE 300 MG/1
TAKE 1 CAPSULE BY MOUTH THREE TIMES DAILY CAPSULE ORAL
Qty: 15 | Refills: 0 | Status: ON HOLD | COMMUNITY
Start: 2020-01-08

## 2023-07-24 RX ORDER — AMLODIPINE BESYLATE 5 MG/1
TK 1 T PO QD TABLET ORAL
Qty: 30 | Refills: 0 | Status: ON HOLD | COMMUNITY
Start: 2020-01-05

## 2023-07-24 RX ORDER — METOCLOPRAMIDE 10 MG/1
TAKE 1 TABLET BY MOUTH EVERY 6 TO 8 HOURS WITH MEALS TABLET ORAL
Qty: 120 | Refills: 0 | Status: ON HOLD | COMMUNITY
Start: 2020-03-28

## 2023-07-24 RX ORDER — LANSOPRAZOLE 30 MG/1
1 CAPSULE BEFORE A MEAL CAPSULE, DELAYED RELEASE ORAL ONCE A DAY
Status: ACTIVE | COMMUNITY

## 2023-07-24 NOTE — HPI-TODAY'S VISIT:
Ms. Soriano is a 46 yo female with a history of indeterminate colitis, IBS, chronic abdominal pain, GERD and hypertension presenting for follow-up.   Recent labs and stool studies were unremarkable.  Recent MRI enterography 6/28/23 revealed no evidence of bowel wall thickening, inflammation or mucosal enhancement. Overall she is doing better and has less abdominal pain and diarrhea.  She is now having more constipation and has been using stool softners.  When she is more constipated she has increasesed back pain and will use Tylenol which helps.  SHe started Levsin which helps with the abdominal cramping.  She still has some bloating.  She is trying to eat a high fiber diet.  She otherwise denies nausea, voniting, change in bowel habits or blood in stool.

## 2023-07-24 NOTE — HPI-OTHER HISTORIES
She was previously seen in the office in May 2020 for a second opinion regarding chronic GI symptoms of nausea, regurgitation, vomiting, abdominal pain, and constipation. Previous 4-hour gastric emptying study suggested gastroparesis. Prior EGD was fairly unremarkable but colonoscopy did reveal focal active colitis possibly related to infectious enteritis or NSAID use, and removal of a serrated adenoma. US and CT were unremarkable but gastrin and chromogranin levels were elevated concerning for underlying gastrinoma. She was recommended somatostatin receptor-based imaging study and/or EUS. Functional dyspepsia and irritable bowel syndrome were within the differential. Various options were discussed, including consideration for a trial of Motegrity, smart pill to assess whole gut motility, EUS, and capsule endoscopy. She was encouraged to follow-up with a GI who accepted her insurance with consideration for a referral to a tertiary care center. She ultimately returned to [facility] following that last visit due to insurance barriers. She has undergone multiple imaging studies, repeat colonoscopy, and stool studies. She states she experienced a symptom exacerbation in July and underwent a colonoscopy in October which showed inflammation. She was started on budesonide and the symptoms resolved, but have recurred. She does not have any knowledge of a diagnosis of microscopic or collagenous colitis, repeatedly stating "they cannot figure out where the inflammation is coming from." She complains of mid to lower abdominal cramping and diffuse bloating. She is having 4-5 loose stools per day with associated mucus but no red blood. Her PCP started her on a repeat 4-week course of budesonide about two days ago and her symptoms have already improved. Her abdominal pain has lessened and stool frequency has reduced to 2x per day. She has been on multiple rounds of antibiotics with resultant issues with flush; this is currently being managed by ENT. She is also followed by urology for recurrent UTIs.  [facility] reviewed with approximately 40 pages of notes.  Recent colonoscopy with Dr. Kevin 10/18/2022 revealed stool in the right colon which was partially cleared with fair visualization, single aphthous erosion in the ascending colon, normal terminal ileum, 5 polyps ranging in size from 5 to 6 mm removed in the rectum, small internal hemorrhoids and random biopsies were obtained.  The polyps were all hyperplastic polyps.  Small bowel biopsies revealed no abnormalities and random colon biopsies revealed no abnormalities including negative for microscopic colitis.  She underwent EGD with Dr. Kevin May 2020 which revealed a normal esophagus, patchy mild nonerosive gastritis, single small diverticulum in the second portion duodenum otherwise normal duodenum.  She also underwent colonoscopy with Dr. Flanagan May 2020 which revealed a normal terminal ileum, few aphthous erosions at the IC valve and ascending colon, 8 mm polyp in the distal ascending colon, otherwise normal colon.  Random colon biopsies were obtained.  Labs from September 2022 revealed a normal CBC, CMP and lipase.  She was started on 8 weeks of budesonide.  Her diarrhea improved from 7 stools a day to 2 semiformed bowel movements daily.   Previous colonoscopy with Dr. Flanagan in 2020 did reveal indeterminate colitis and reportedly was treated with budesonide at that time.  I believe she had elevated gastrin levels and fecal calprotectin.  She also had delayed gastric emptying on gastric emptying study in the past.

## 2023-09-24 ENCOUNTER — WEB ENCOUNTER (OUTPATIENT)
Dept: URBAN - METROPOLITAN AREA CLINIC 113 | Facility: CLINIC | Age: 48
End: 2023-09-24

## 2023-09-24 RX ORDER — RIFAXIMIN 550 MG/1
1 TABLET TABLET ORAL THREE TIMES A DAY
Qty: 42 TABLET | Refills: 2 | OUTPATIENT
Start: 2023-09-26 | End: 2023-11-06

## 2023-09-26 ENCOUNTER — TELEPHONE ENCOUNTER (OUTPATIENT)
Dept: URBAN - METROPOLITAN AREA CLINIC 113 | Facility: CLINIC | Age: 48
End: 2023-09-26

## 2023-09-27 ENCOUNTER — TELEPHONE ENCOUNTER (OUTPATIENT)
Dept: URBAN - METROPOLITAN AREA CLINIC 113 | Facility: CLINIC | Age: 48
End: 2023-09-27

## 2023-09-28 ENCOUNTER — WEB ENCOUNTER (OUTPATIENT)
Dept: URBAN - METROPOLITAN AREA CLINIC 113 | Facility: CLINIC | Age: 48
End: 2023-09-28

## 2023-09-28 RX ORDER — HYOSCYAMINE SULFATE 0.12 MG/1
1 TABLET UNDER THE TONGUE AND ALLOW TO DISSOLVE AS NEEDED TABLET, ORALLY DISINTEGRATING ORAL
Qty: 60 | Refills: 1

## 2023-10-24 ENCOUNTER — TELEPHONE ENCOUNTER (OUTPATIENT)
Dept: URBAN - METROPOLITAN AREA CLINIC 113 | Facility: CLINIC | Age: 48
End: 2023-10-24

## 2023-11-03 ENCOUNTER — TELEPHONE ENCOUNTER (OUTPATIENT)
Dept: URBAN - METROPOLITAN AREA CLINIC 113 | Facility: CLINIC | Age: 48
End: 2023-11-03

## 2023-11-03 ENCOUNTER — OFFICE VISIT (OUTPATIENT)
Dept: URBAN - METROPOLITAN AREA CLINIC 113 | Facility: CLINIC | Age: 48
End: 2023-11-03
Payer: COMMERCIAL

## 2023-11-03 VITALS
BODY MASS INDEX: 23.64 KG/M2 | WEIGHT: 156 LBS | SYSTOLIC BLOOD PRESSURE: 123 MMHG | TEMPERATURE: 96.9 F | RESPIRATION RATE: 16 BRPM | DIASTOLIC BLOOD PRESSURE: 89 MMHG | HEIGHT: 68 IN | HEART RATE: 82 BPM

## 2023-11-03 DIAGNOSIS — R10.84 GENERALIZED ABDOMINAL PAIN: ICD-10-CM

## 2023-11-03 DIAGNOSIS — K58.0 IRRITABLE BOWEL SYNDROME WITH DIARRHEA: ICD-10-CM

## 2023-11-03 DIAGNOSIS — K52.3 INDETERMINATE COLITIS: ICD-10-CM

## 2023-11-03 DIAGNOSIS — K59.1 FUNCTIONAL DIARRHEA: ICD-10-CM

## 2023-11-03 DIAGNOSIS — R10.13 DYSPEPSIA: ICD-10-CM

## 2023-11-03 DIAGNOSIS — R19.7 DIARRHEA: ICD-10-CM

## 2023-11-03 DIAGNOSIS — K30 DELAYED GASTRIC EMPTYING: ICD-10-CM

## 2023-11-03 PROCEDURE — 99214 OFFICE O/P EST MOD 30 MIN: CPT | Performed by: INTERNAL MEDICINE

## 2023-11-03 RX ORDER — MONTELUKAST SODIUM 10 MG/1
TAKE 1 TABLET BY MOUTH ONCE DAILY TABLET, FILM COATED ORAL
Qty: 30 | Refills: 0 | Status: ON HOLD | COMMUNITY
Start: 2019-04-15

## 2023-11-03 RX ORDER — POTASSIUM CHLORIDE 20 MEQ/1
TAKE 1 TABLET BY MOUTH TWICE DAILY TABLET, EXTENDED RELEASE ORAL
Qty: 6 | Refills: 0 | Status: ON HOLD | COMMUNITY
Start: 2020-01-04

## 2023-11-03 RX ORDER — HYDROCHLOROTHIAZIDE 12.5 MG/1
TAKE 1 TABLET BY MOUTH ONCE DAILY TABLET ORAL
Qty: 30 | Refills: 0 | Status: ON HOLD | COMMUNITY
Start: 2019-10-16

## 2023-11-03 RX ORDER — LISINOPRIL 20 MG/1
TK 1 T PO QD TABLET ORAL
Qty: 90 | Refills: 0 | Status: ON HOLD | COMMUNITY
Start: 2020-04-08

## 2023-11-03 RX ORDER — DOCUSATE SODIUM 100 MG/1
TAKE 1 CAPSULE BY MOUTH TWICE DAILY AS NEEDED FOR CONSTIPATION CAPSULE, LIQUID FILLED ORAL
Qty: 60 | Refills: 0 | Status: ON HOLD | COMMUNITY
Start: 2020-04-01

## 2023-11-03 RX ORDER — KETOROLAC TROMETHAMINE 10 MG/1
TAKE 1 TABLET BY MOUTH EVERY 12 HOURS AS NEEDED FOR PAIN TABLET, FILM COATED ORAL
Qty: 10 | Refills: 0 | Status: ON HOLD | COMMUNITY
Start: 2019-12-07

## 2023-11-03 RX ORDER — OMEPRAZOLE 40 MG/1
TK 1 C PO BID CAPSULE, DELAYED RELEASE ORAL
Qty: 180 | Refills: 0 | Status: ON HOLD | COMMUNITY
Start: 2019-03-13

## 2023-11-03 RX ORDER — PANTOPRAZOLE SODIUM 40 MG/1
TAKE 1 TABLET BY MOUTH ONCE DAILY IF SYMPTOMS PERSIST AFTER 1 WEEK TAK TABLET, DELAYED RELEASE ORAL
Qty: 53 | Refills: 0 | Status: ON HOLD | COMMUNITY
Start: 2020-02-28

## 2023-11-03 RX ORDER — LANSOPRAZOLE 30 MG/1
1 CAPSULE BEFORE A MEAL CAPSULE, DELAYED RELEASE ORAL ONCE A DAY
Status: ACTIVE | COMMUNITY

## 2023-11-03 RX ORDER — ESOMEPRAZOLE MAGNESIUM 40 MG/1
TAKE 1 CAPSULE BY MOUTH ONCE DAILY CAPSULE, DELAYED RELEASE PELLETS ORAL
Qty: 30 | Refills: 0 | Status: ON HOLD | COMMUNITY
Start: 2020-01-19

## 2023-11-03 RX ORDER — LANSOPRAZOLE 30 MG/1
TAKE 1 CAPSULE TWICE DAILY CAPSULE, DELAYED RELEASE ORAL
Qty: 60 | Refills: 0 | Status: ON HOLD | COMMUNITY
Start: 2020-05-28

## 2023-11-03 RX ORDER — SUMATRIPTAN SUCCINATE 25 MG/1
TAKE 1 TABLET BY MOUTH EVERY 2 HOURS AS NEEDED FOR MIGRAINE TABLET ORAL
Qty: 9 | Refills: 0 | Status: ON HOLD | COMMUNITY
Start: 2020-02-19

## 2023-11-03 RX ORDER — FLUTICASONE PROPIONATE 50 UG/1
USE 2 SPRAY(S) IN EACH NOSTRIL ONCE DAILY SPRAY, METERED NASAL
Qty: 16 | Refills: 0 | Status: ACTIVE | COMMUNITY
Start: 2019-12-07

## 2023-11-03 RX ORDER — ONDANSETRON 4 MG/1
DISSOLVE 1 TABLET IN MOUTH EVERY 8 HOURS AS NEEDED FOR NAUSEA AND VOMI TABLET, ORALLY DISINTEGRATING ORAL
Qty: 10 | Refills: 0 | Status: ON HOLD | COMMUNITY
Start: 2020-03-08

## 2023-11-03 RX ORDER — SULFAMETHOXAZOLE AND TRIMETHOPRIM 800; 160 MG/1; MG/1
TAKE 1 TABLET BY MOUTH TWICE DAILY TABLET ORAL
Qty: 6 | Refills: 0 | Status: ON HOLD | COMMUNITY
Start: 2019-05-24

## 2023-11-03 RX ORDER — DOXYCYCLINE 100 MG/1
TAKE 1 CAPSULE BY MOUTH TWICE DAILY FOR 10 DAYS CAPSULE ORAL
Qty: 20 | Refills: 0 | Status: ON HOLD | COMMUNITY
Start: 2019-12-19

## 2023-11-03 RX ORDER — HYDROCODONE BITARTRATE AND ACETAMINOPHEN 5; 325 MG/1; MG/1
TK 1 T PO Q 6 H PRN FOR PAIN TABLET ORAL
Qty: 10 | Refills: 0 | Status: ON HOLD | COMMUNITY
Start: 2020-02-25

## 2023-11-03 RX ORDER — CIPROFLOXACIN HYDROCHLORIDE 500 MG/1
TAKE 1 TABLET BY MOUTH TWICE DAILY TABLET, FILM COATED ORAL
Qty: 20 | Refills: 0 | Status: ON HOLD | COMMUNITY
Start: 2019-10-16

## 2023-11-03 RX ORDER — SUCRALFATE 1 G/10ML
TK 10ML PO QID SUSPENSION ORAL
Qty: 420 | Refills: 0 | Status: ON HOLD | COMMUNITY
Start: 2020-03-27

## 2023-11-03 RX ORDER — CETIRIZINE HYDROCHLORIDE 10 MG/1
TAKE 1 TABLET BY MOUTH ONCE DAILY TABLET, FILM COATED ORAL
Qty: 14 | Refills: 0 | Status: ACTIVE | COMMUNITY
Start: 2019-12-07

## 2023-11-03 RX ORDER — OLMESARTAN MEDOXOMIL 40 MG/1
TK 1 T PO ONCE A DAY TABLET ORAL
Qty: 30 | Refills: 0 | Status: ON HOLD | COMMUNITY
Start: 2020-05-11

## 2023-11-03 RX ORDER — COLESTIPOL HYDROCHLORIDE 1 G/1
1 TABLETS TABLET, FILM COATED ORAL ONCE A DAY
Qty: 90 TABLET | Refills: 3 | Status: ON HOLD | COMMUNITY
Start: 2023-07-03

## 2023-11-03 RX ORDER — OMEPRAZOLE 20 MG/1
TAKE 1 CAPSULE BY MOUTH ONCE DAILY CAPSULE, DELAYED RELEASE ORAL
Qty: 30 | Refills: 0 | Status: ON HOLD | COMMUNITY
Start: 2018-09-10

## 2023-11-03 RX ORDER — BUTALBITAL, ACETAMINOPHEN, AND CAFFEINE 50; 325; 40 MG/1; MG/1; MG/1
TK 1-2 TS PO Q 6 H PRF HA TABLET ORAL
Qty: 20 | Refills: 0 | Status: ON HOLD | COMMUNITY
Start: 2020-04-22

## 2023-11-03 RX ORDER — SUCRALFATE 1 G/1
TK 1 T PO BID BEFORE MEALS TABLET ORAL
Qty: 20 | Refills: 0 | Status: ON HOLD | COMMUNITY
Start: 2020-02-25

## 2023-11-03 RX ORDER — AMLODIPINE BESYLATE 10 MG/1
TAKE 1 TABLET BY MOUTH ONCE DAILY TABLET ORAL
Qty: 90 | Refills: 0 | Status: ON HOLD | COMMUNITY
Start: 2020-02-19

## 2023-11-03 RX ORDER — HYOSCYAMINE SULFATE 0.12 MG/1
1 TABLET UNDER THE TONGUE AND ALLOW TO DISSOLVE AS NEEDED TABLET, ORALLY DISINTEGRATING ORAL
Qty: 60 | Refills: 1 | Status: ACTIVE | COMMUNITY

## 2023-11-03 RX ORDER — HYOSCYAMINE SULFATE 0.12 MG/1
1 TABLET UNDER THE TONGUE AND ALLOW TO DISSOLVE  AS NEEDED TABLET, ORALLY DISINTEGRATING ORAL
Qty: 60 | Refills: 2 | OUTPATIENT

## 2023-11-03 RX ORDER — METOCLOPRAMIDE 10 MG/1
TAKE 1 TABLET BY MOUTH EVERY 6 TO 8 HOURS WITH MEALS TABLET ORAL
Qty: 120 | Refills: 0 | Status: ON HOLD | COMMUNITY
Start: 2020-03-28

## 2023-11-03 RX ORDER — COLESTIPOL HYDROCHLORIDE 1 G/1
1 TABLETS TABLET, FILM COATED ORAL ONCE A DAY
Qty: 90 TABLET | Refills: 3
Start: 2023-07-03

## 2023-11-03 RX ORDER — RIFAXIMIN 550 MG/1
1 TABLET TABLET ORAL THREE TIMES A DAY
Qty: 42 TABLET | Refills: 2 | Status: ACTIVE | COMMUNITY
Start: 2023-09-26 | End: 2023-11-06

## 2023-11-03 RX ORDER — CLOTRIMAZOLE AND BETAMETHASONE DIPROPIONATE 10; .5 MG/G; MG/G
APPLY CREAM TO AFFECTED AREA TWICE DAILY FOR 7 DAYS CREAM TOPICAL
Qty: 15 | Refills: 0 | Status: ON HOLD | COMMUNITY
Start: 2019-12-09

## 2023-11-03 RX ORDER — POLYETHYLENE GLYCOL 3350, SODIUM CHLORIDE, SODIUM BICARBONATE AND POTASSIUM CHLORIDE WITH LEMON FLAVOR 420; 11.2; 5.72; 1.48 G/4L; G/4L; G/4L; G/4L
MIX AND DRINK UTD POWDER, FOR SOLUTION ORAL
Qty: 4000 | Refills: 0 | Status: ON HOLD | COMMUNITY
Start: 2020-04-27

## 2023-11-03 RX ORDER — DICYCLOMINE HYDROCHLORIDE 20 MG/1
TAKE 1 TABLET PO EVERY 6 HOURS PRN TABLET ORAL
Qty: 20 | Refills: 0 | Status: ON HOLD | COMMUNITY
Start: 2020-05-19

## 2023-11-03 RX ORDER — WHEAT DEXTRIN 3 G/3.5 G
1 TABLESPOON POWDER (GRAM) ORAL DAILY
Qty: 90 | Refills: 1 | Status: ACTIVE | COMMUNITY

## 2023-11-03 RX ORDER — PREDNISONE 10 MG/1
TAKE 4 TABLETS BY MOUTH ONCE DAILY FOR 2 DAYS THEN 3 TABS ONCE DAILY F TABLET ORAL
Qty: 16 | Refills: 0 | Status: ON HOLD | COMMUNITY
Start: 2019-12-09

## 2023-11-03 RX ORDER — CLARITHROMYCIN 500 MG/1
TAKE 1 TABLET BY MOUTH TWICE DAILY TABLET, FILM COATED ORAL
Qty: 20 | Refills: 0 | Status: ON HOLD | COMMUNITY
Start: 2020-03-02

## 2023-11-03 RX ORDER — LEVOCETIRIZINE DIHYDROCHLORIDE 5 MG/1
TAKE 1 TABLET BY MOUTH AT BEDTIME TABLET ORAL
Qty: 30 | Refills: 0 | Status: ON HOLD | COMMUNITY
Start: 2018-08-13

## 2023-11-03 RX ORDER — CEPHALEXIN 500 MG/1
TK ONE C PO QID FOR 7 DAYS CAPSULE ORAL
Qty: 28 | Refills: 0 | Status: ON HOLD | COMMUNITY
Start: 2020-05-24

## 2023-11-03 RX ORDER — OLMESARTAN MEDOXOMIL 20 MG/1
TAKE 1 TABLET BY MOUTH ONCE DAILY TABLET, FILM COATED ORAL
Qty: 30 | Refills: 0 | Status: ON HOLD | COMMUNITY
Start: 2020-04-24

## 2023-11-03 RX ORDER — AMLODIPINE BESYLATE 5 MG/1
TK 1 T PO QD TABLET ORAL
Qty: 30 | Refills: 0 | Status: ON HOLD | COMMUNITY
Start: 2020-01-05

## 2023-11-03 RX ORDER — AMOXICILLIN 500 MG/1
TAKE 1 CAPSULE BY MOUTH EVERY 12 HOURS CAPSULE ORAL
Qty: 20 | Refills: 0 | Status: ON HOLD | COMMUNITY
Start: 2019-12-07

## 2023-11-03 RX ORDER — DEXLANSOPRAZOLE 30 MG/1
TAKE 1 CAPSULE DAILY EVERY MORNING BEFORE BREAKFAST CAPSULE, DELAYED RELEASE ORAL
Refills: 3 | Status: ON HOLD | COMMUNITY

## 2023-11-03 RX ORDER — WHEAT DEXTRIN 3 G/3.5 G
1 TABLESPOON POWDER (GRAM) ORAL DAILY
Qty: 90 | Refills: 1 | OUTPATIENT

## 2023-11-03 RX ORDER — RIFAXIMIN 550 MG/1
1 TABLET TABLET ORAL THREE TIMES A DAY
Qty: 42 TABLET | Refills: 2 | OUTPATIENT
Start: 2023-11-03 | End: 2023-12-14

## 2023-11-03 RX ORDER — OLMESARTAN MEDOXOMIL 40 MG/1
TK 2 TS PO D FOR 60 DAYS TABLET ORAL
Qty: 30 | Refills: 0 | Status: ON HOLD | COMMUNITY
Start: 2020-05-15

## 2023-11-03 RX ORDER — SUMATRIPTAN SUCCINATE 50 MG/1
TAKE 1 TABLET BY MOUTH ONCE DAILY AS NEEDED FOR MIGRAINE HEADACHE TABLET, FILM COATED ORAL
Qty: 9 | Refills: 0 | Status: ON HOLD | COMMUNITY
Start: 2019-10-16

## 2023-11-03 RX ORDER — BUDESONIDE 3 MG/1
TAKE 3 CAPSULES BY MOUTH IN THE MORNING CAPSULE, COATED PELLETS ORAL
Status: ON HOLD | COMMUNITY
Start: 2020-05-07

## 2023-11-03 RX ORDER — FAMOTIDINE 40 MG/1
TAKE 1 TABLET BY MOUTH TWICE DAILY AS NEEDED TABLET ORAL
Qty: 60 | Refills: 0 | Status: ACTIVE | COMMUNITY
Start: 2019-10-16

## 2023-11-03 RX ORDER — CLINDAMYCIN HYDROCHLORIDE 300 MG/1
TAKE 1 CAPSULE BY MOUTH THREE TIMES DAILY CAPSULE ORAL
Qty: 15 | Refills: 0 | Status: ON HOLD | COMMUNITY
Start: 2020-01-08

## 2023-11-03 RX ORDER — PROMETHAZINE HYDROCHLORIDE 25 MG/1
TAKE 1 TABLET BY MOUTH EVERY 6 HOURS AS NEEDED FOR NAUSEA FOR UP TO 7 TABLET ORAL
Qty: 20 | Refills: 0 | Status: ON HOLD | COMMUNITY
Start: 2020-04-12

## 2023-11-03 RX ORDER — POTASSIUM CHLORIDE 750 MG/1
TAKE 1 TABLET BY MOUTH ONCE DAILY TABLET, EXTENDED RELEASE ORAL
Qty: 10 | Refills: 0 | Status: ON HOLD | COMMUNITY
Start: 2020-04-01

## 2023-11-03 RX ORDER — HYDROCHLOROTHIAZIDE 25 MG/1
TAKE 1 TABLET BY MOUTH ONCE DAILY TABLET ORAL
Qty: 30 | Refills: 0 | Status: ACTIVE | COMMUNITY
Start: 2019-04-15

## 2023-11-03 NOTE — HPI-OTHER HISTORIES
Previous 4-hour gastric emptying study suggested gastroparesis. Prior EGD was fairly unremarkable but colonoscopy did reveal focal active colitis possibly related to infectious enteritis or NSAID use, and removal of a serrated adenoma. US and CT were unremarkable but gastrin and chromogranin levels were elevated concerning for underlying gastrinoma.  Prior colonoscopy with Dr. Kevin 10/18/2022 revealed stool in the right colon which was partially cleared with fair visualization, single aphthous erosion in the ascending colon, normal terminal ileum, 5 polyps ranging in size from 5 to 6 mm removed in the rectum, small internal hemorrhoids and random biopsies were obtained.  The polyps were all hyperplastic polyps.  Small bowel biopsies revealed no abnormalities and random colon biopsies revealed no abnormalities including negative for microscopic colitis.  She underwent EGD with Dr. Kevin May 2020 which revealed a normal esophagus, patchy mild nonerosive gastritis, single small diverticulum in the second portion duodenum otherwise normal duodenum.  She also underwent colonoscopy with Dr. Flanagan May 2020 which revealed a normal terminal ileum, few aphthous erosions at the IC valve and ascending colon, 8 mm polyp in the distal ascending colon, otherwise normal colon.  Random colon biopsies were obtained.

## 2023-11-03 NOTE — HPI-TODAY'S VISIT:
Ms. Soriano is a 48 yo female with a history of indeterminate colitis, diarrhea predominant IBS, bile salt induced diarrhea, chronic abdominal pain, GERD and hypertension presenting for urgent follow-up.  At her last office visit she was prescribed Xifaxan for IBS as well as colestipol.  She completed a 14-day course with complete resolution of symptoms.  Her symptoms started to recur around 1 week afterwards.  She then restarted her colestipol once daily and Imodium.  Her diarrhea has improved, but is still having 2-3 loose bowel movements daily.  There is no blood in the stool.  She has a decreased appetite and has lost around 8 pounds.  She is trying to make dietary changes.  She has a constant burning in her throat and chest after eating.  She is on Prevacid twice daily and famotidine twice daily.  She is also using Mylanta.  The Levsin is helpful in controlling the abdominal cramping.   Prior MRI enterography 6/28/23 revealed no evidence of bowel wall thickening, inflammation or mucosal enhancement.

## 2023-11-07 ENCOUNTER — TELEPHONE ENCOUNTER (OUTPATIENT)
Dept: URBAN - METROPOLITAN AREA CLINIC 113 | Facility: CLINIC | Age: 48
End: 2023-11-07

## 2024-01-08 ENCOUNTER — OFFICE VISIT (OUTPATIENT)
Dept: URBAN - METROPOLITAN AREA CLINIC 113 | Facility: CLINIC | Age: 49
End: 2024-01-08
Payer: COMMERCIAL

## 2024-01-08 VITALS
WEIGHT: 168 LBS | DIASTOLIC BLOOD PRESSURE: 80 MMHG | SYSTOLIC BLOOD PRESSURE: 118 MMHG | HEART RATE: 83 BPM | BODY MASS INDEX: 25.46 KG/M2 | RESPIRATION RATE: 20 BRPM | HEIGHT: 68 IN | TEMPERATURE: 97.1 F

## 2024-01-08 DIAGNOSIS — K52.3 INDETERMINATE COLITIS: ICD-10-CM

## 2024-01-08 DIAGNOSIS — R10.13 DYSPEPSIA: ICD-10-CM

## 2024-01-08 DIAGNOSIS — R10.84 GENERALIZED ABDOMINAL PAIN: ICD-10-CM

## 2024-01-08 DIAGNOSIS — K59.09 CHRONIC CONSTIPATION: ICD-10-CM

## 2024-01-08 DIAGNOSIS — R19.7 DIARRHEA: ICD-10-CM

## 2024-01-08 DIAGNOSIS — K59.1 FUNCTIONAL DIARRHEA: ICD-10-CM

## 2024-01-08 DIAGNOSIS — K30 DELAYED GASTRIC EMPTYING: ICD-10-CM

## 2024-01-08 DIAGNOSIS — K58.0 IRRITABLE BOWEL SYNDROME WITH DIARRHEA: ICD-10-CM

## 2024-01-08 PROBLEM — 236069009: Status: ACTIVE | Noted: 2024-01-08

## 2024-01-08 PROCEDURE — 99214 OFFICE O/P EST MOD 30 MIN: CPT | Performed by: INTERNAL MEDICINE

## 2024-01-08 RX ORDER — LISINOPRIL 20 MG/1
TK 1 T PO QD TABLET ORAL
Qty: 90 | Refills: 0 | Status: ON HOLD | COMMUNITY
Start: 2020-04-08

## 2024-01-08 RX ORDER — HYOSCYAMINE SULFATE 0.12 MG/1
1 TABLET UNDER THE TONGUE AND ALLOW TO DISSOLVE  AS NEEDED TABLET, ORALLY DISINTEGRATING ORAL
Qty: 60 | Refills: 2 | Status: ACTIVE | COMMUNITY

## 2024-01-08 RX ORDER — HYDROCHLOROTHIAZIDE 12.5 MG/1
TAKE 1 TABLET BY MOUTH ONCE DAILY TABLET ORAL
Qty: 30 | Refills: 0 | Status: ON HOLD | COMMUNITY
Start: 2019-10-16

## 2024-01-08 RX ORDER — OMEPRAZOLE 40 MG/1
TK 1 C PO BID CAPSULE, DELAYED RELEASE ORAL
Qty: 180 | Refills: 0 | Status: ON HOLD | COMMUNITY
Start: 2019-03-13

## 2024-01-08 RX ORDER — PROMETHAZINE HYDROCHLORIDE 25 MG/1
TAKE 1 TABLET BY MOUTH EVERY 6 HOURS AS NEEDED FOR NAUSEA FOR UP TO 7 TABLET ORAL
Qty: 20 | Refills: 0 | Status: ON HOLD | COMMUNITY
Start: 2020-04-12

## 2024-01-08 RX ORDER — CLARITHROMYCIN 500 MG/1
TAKE 1 TABLET BY MOUTH TWICE DAILY TABLET, FILM COATED ORAL
Qty: 20 | Refills: 0 | Status: ON HOLD | COMMUNITY
Start: 2020-03-02

## 2024-01-08 RX ORDER — PLECANATIDE 3 MG/1
1 TABLET TABLET ORAL ONCE A DAY
Qty: 90 TABLET | Refills: 2 | OUTPATIENT
Start: 2024-01-08 | End: 2024-10-04

## 2024-01-08 RX ORDER — OLMESARTAN MEDOXOMIL 40 MG/1
TK 2 TS PO D FOR 60 DAYS TABLET ORAL
Qty: 30 | Refills: 0 | Status: ON HOLD | COMMUNITY
Start: 2020-05-15

## 2024-01-08 RX ORDER — AMLODIPINE BESYLATE 5 MG/1
TK 1 T PO QD TABLET ORAL
Qty: 30 | Refills: 0 | Status: ON HOLD | COMMUNITY
Start: 2020-01-05

## 2024-01-08 RX ORDER — CLOTRIMAZOLE AND BETAMETHASONE DIPROPIONATE 10; .5 MG/G; MG/G
APPLY CREAM TO AFFECTED AREA TWICE DAILY FOR 7 DAYS CREAM TOPICAL
Qty: 15 | Refills: 0 | Status: ON HOLD | COMMUNITY
Start: 2019-12-09

## 2024-01-08 RX ORDER — LEVOCETIRIZINE DIHYDROCHLORIDE 5 MG/1
TAKE 1 TABLET BY MOUTH AT BEDTIME TABLET ORAL
Qty: 30 | Refills: 0 | Status: ON HOLD | COMMUNITY
Start: 2018-08-13

## 2024-01-08 RX ORDER — POTASSIUM CHLORIDE 20 MEQ/1
TAKE 1 TABLET BY MOUTH TWICE DAILY TABLET, EXTENDED RELEASE ORAL
Qty: 6 | Refills: 0 | Status: ON HOLD | COMMUNITY
Start: 2020-01-04

## 2024-01-08 RX ORDER — MONTELUKAST SODIUM 10 MG/1
TAKE 1 TABLET BY MOUTH ONCE DAILY TABLET, FILM COATED ORAL
Qty: 30 | Refills: 0 | Status: ON HOLD | COMMUNITY
Start: 2019-04-15

## 2024-01-08 RX ORDER — FLUTICASONE PROPIONATE 50 UG/1
USE 2 SPRAY(S) IN EACH NOSTRIL ONCE DAILY SPRAY, METERED NASAL
Qty: 16 | Refills: 0 | Status: ACTIVE | COMMUNITY
Start: 2019-12-07

## 2024-01-08 RX ORDER — KETOROLAC TROMETHAMINE 10 MG/1
TAKE 1 TABLET BY MOUTH EVERY 12 HOURS AS NEEDED FOR PAIN TABLET, FILM COATED ORAL
Qty: 10 | Refills: 0 | Status: ON HOLD | COMMUNITY
Start: 2019-12-07

## 2024-01-08 RX ORDER — HYDROCODONE BITARTRATE AND ACETAMINOPHEN 5; 325 MG/1; MG/1
TK 1 T PO Q 6 H PRN FOR PAIN TABLET ORAL
Qty: 10 | Refills: 0 | Status: ON HOLD | COMMUNITY
Start: 2020-02-25

## 2024-01-08 RX ORDER — LANSOPRAZOLE 30 MG/1
1 CAPSULE BEFORE A MEAL CAPSULE, DELAYED RELEASE ORAL ONCE A DAY
Status: ACTIVE | COMMUNITY

## 2024-01-08 RX ORDER — WHEAT DEXTRIN 3 G/3.5 G
1 TABLESPOON POWDER (GRAM) ORAL DAILY
Qty: 90 | Refills: 1 | OUTPATIENT

## 2024-01-08 RX ORDER — COLESTIPOL HYDROCHLORIDE 1 G/1
1 TABLETS TABLET, FILM COATED ORAL ONCE A DAY
Qty: 90 TABLET | Refills: 3 | Status: ACTIVE | COMMUNITY
Start: 2023-07-03

## 2024-01-08 RX ORDER — ONDANSETRON 4 MG/1
DISSOLVE 1 TABLET IN MOUTH EVERY 8 HOURS AS NEEDED FOR NAUSEA AND VOMI TABLET, ORALLY DISINTEGRATING ORAL
Qty: 10 | Refills: 0 | Status: ON HOLD | COMMUNITY
Start: 2020-03-08

## 2024-01-08 RX ORDER — SUMATRIPTAN SUCCINATE 25 MG/1
TAKE 1 TABLET BY MOUTH EVERY 2 HOURS AS NEEDED FOR MIGRAINE TABLET ORAL
Qty: 9 | Refills: 0 | Status: ON HOLD | COMMUNITY
Start: 2020-02-19

## 2024-01-08 RX ORDER — HYOSCYAMINE SULFATE 0.12 MG/1
1 TABLET UNDER THE TONGUE AND ALLOW TO DISSOLVE  AS NEEDED TABLET, ORALLY DISINTEGRATING ORAL
Qty: 60 | Refills: 2 | OUTPATIENT

## 2024-01-08 RX ORDER — WHEAT DEXTRIN 3 G/3.5 G
1 TABLESPOON POWDER (GRAM) ORAL DAILY
Qty: 90 | Refills: 1 | Status: ACTIVE | COMMUNITY

## 2024-01-08 RX ORDER — AMOXICILLIN 500 MG/1
TAKE 1 CAPSULE BY MOUTH EVERY 12 HOURS CAPSULE ORAL
Qty: 20 | Refills: 0 | Status: ON HOLD | COMMUNITY
Start: 2019-12-07

## 2024-01-08 RX ORDER — POTASSIUM CHLORIDE 750 MG/1
TAKE 1 TABLET BY MOUTH ONCE DAILY TABLET, EXTENDED RELEASE ORAL
Qty: 10 | Refills: 0 | Status: ON HOLD | COMMUNITY
Start: 2020-04-01

## 2024-01-08 RX ORDER — CEPHALEXIN 500 MG/1
TK ONE C PO QID FOR 7 DAYS CAPSULE ORAL
Qty: 28 | Refills: 0 | Status: ON HOLD | COMMUNITY
Start: 2020-05-24

## 2024-01-08 RX ORDER — OLMESARTAN MEDOXOMIL 20 MG/1
TAKE 1 TABLET BY MOUTH ONCE DAILY TABLET, FILM COATED ORAL
Qty: 30 | Refills: 0 | Status: ON HOLD | COMMUNITY
Start: 2020-04-24

## 2024-01-08 RX ORDER — DOXYCYCLINE 100 MG/1
TAKE 1 CAPSULE BY MOUTH TWICE DAILY FOR 10 DAYS CAPSULE ORAL
Qty: 20 | Refills: 0 | Status: ON HOLD | COMMUNITY
Start: 2019-12-19

## 2024-01-08 RX ORDER — CLINDAMYCIN HYDROCHLORIDE 300 MG/1
TAKE 1 CAPSULE BY MOUTH THREE TIMES DAILY CAPSULE ORAL
Qty: 15 | Refills: 0 | Status: ON HOLD | COMMUNITY
Start: 2020-01-08

## 2024-01-08 RX ORDER — SUMATRIPTAN SUCCINATE 50 MG/1
TAKE 1 TABLET BY MOUTH ONCE DAILY AS NEEDED FOR MIGRAINE HEADACHE TABLET, FILM COATED ORAL
Qty: 9 | Refills: 0 | Status: ON HOLD | COMMUNITY
Start: 2019-10-16

## 2024-01-08 RX ORDER — BUTALBITAL, ACETAMINOPHEN, AND CAFFEINE 50; 325; 40 MG/1; MG/1; MG/1
TK 1-2 TS PO Q 6 H PRF HA TABLET ORAL
Qty: 20 | Refills: 0 | Status: ON HOLD | COMMUNITY
Start: 2020-04-22

## 2024-01-08 RX ORDER — DEXLANSOPRAZOLE 30 MG/1
TAKE 1 CAPSULE DAILY EVERY MORNING BEFORE BREAKFAST CAPSULE, DELAYED RELEASE ORAL
Refills: 3 | Status: ON HOLD | COMMUNITY

## 2024-01-08 RX ORDER — POLYETHYLENE GLYCOL 3350, SODIUM CHLORIDE, SODIUM BICARBONATE AND POTASSIUM CHLORIDE WITH LEMON FLAVOR 420; 11.2; 5.72; 1.48 G/4L; G/4L; G/4L; G/4L
MIX AND DRINK UTD POWDER, FOR SOLUTION ORAL
Qty: 4000 | Refills: 0 | Status: ON HOLD | COMMUNITY
Start: 2020-04-27

## 2024-01-08 RX ORDER — OMEPRAZOLE 20 MG/1
TAKE 1 CAPSULE BY MOUTH ONCE DAILY CAPSULE, DELAYED RELEASE ORAL
Qty: 30 | Refills: 0 | Status: ON HOLD | COMMUNITY
Start: 2018-09-10

## 2024-01-08 RX ORDER — DICYCLOMINE HYDROCHLORIDE 20 MG/1
TAKE 1 TABLET PO EVERY 6 HOURS PRN TABLET ORAL
Qty: 20 | Refills: 0 | Status: ON HOLD | COMMUNITY
Start: 2020-05-19

## 2024-01-08 RX ORDER — SULFAMETHOXAZOLE AND TRIMETHOPRIM 800; 160 MG/1; MG/1
TAKE 1 TABLET BY MOUTH TWICE DAILY TABLET ORAL
Qty: 6 | Refills: 0 | Status: ON HOLD | COMMUNITY
Start: 2019-05-24

## 2024-01-08 RX ORDER — PANTOPRAZOLE SODIUM 40 MG/1
TAKE 1 TABLET BY MOUTH ONCE DAILY IF SYMPTOMS PERSIST AFTER 1 WEEK TAK TABLET, DELAYED RELEASE ORAL
Qty: 53 | Refills: 0 | Status: ON HOLD | COMMUNITY
Start: 2020-02-28

## 2024-01-08 RX ORDER — SUCRALFATE 1 G/10ML
TK 10ML PO QID SUSPENSION ORAL
Qty: 420 | Refills: 0 | Status: ON HOLD | COMMUNITY
Start: 2020-03-27

## 2024-01-08 RX ORDER — SUCRALFATE 1 G/1
TK 1 T PO BID BEFORE MEALS TABLET ORAL
Qty: 20 | Refills: 0 | Status: ON HOLD | COMMUNITY
Start: 2020-02-25

## 2024-01-08 RX ORDER — METOCLOPRAMIDE 10 MG/1
TAKE 1 TABLET BY MOUTH EVERY 6 TO 8 HOURS WITH MEALS TABLET ORAL
Qty: 120 | Refills: 0 | Status: ON HOLD | COMMUNITY
Start: 2020-03-28

## 2024-01-08 RX ORDER — CETIRIZINE HYDROCHLORIDE 10 MG/1
TAKE 1 TABLET BY MOUTH ONCE DAILY TABLET, FILM COATED ORAL
Qty: 14 | Refills: 0 | Status: ACTIVE | COMMUNITY
Start: 2019-12-07

## 2024-01-08 RX ORDER — BUDESONIDE 3 MG/1
TAKE 3 CAPSULES BY MOUTH IN THE MORNING CAPSULE, COATED PELLETS ORAL
Status: ON HOLD | COMMUNITY
Start: 2020-05-07

## 2024-01-08 RX ORDER — ESOMEPRAZOLE MAGNESIUM 40 MG/1
TAKE 1 CAPSULE BY MOUTH ONCE DAILY CAPSULE, DELAYED RELEASE PELLETS ORAL
Qty: 30 | Refills: 0 | Status: ON HOLD | COMMUNITY
Start: 2020-01-19

## 2024-01-08 RX ORDER — OLMESARTAN MEDOXOMIL 40 MG/1
TK 1 T PO ONCE A DAY TABLET ORAL
Qty: 30 | Refills: 0 | Status: ON HOLD | COMMUNITY
Start: 2020-05-11

## 2024-01-08 RX ORDER — CIPROFLOXACIN HYDROCHLORIDE 500 MG/1
TAKE 1 TABLET BY MOUTH TWICE DAILY TABLET, FILM COATED ORAL
Qty: 20 | Refills: 0 | Status: ON HOLD | COMMUNITY
Start: 2019-10-16

## 2024-01-08 RX ORDER — FAMOTIDINE 40 MG/1
TAKE 1 TABLET BY MOUTH TWICE DAILY AS NEEDED TABLET ORAL
Qty: 60 | Refills: 0 | Status: ACTIVE | COMMUNITY
Start: 2019-10-16

## 2024-01-08 RX ORDER — PREDNISONE 10 MG/1
TAKE 4 TABLETS BY MOUTH ONCE DAILY FOR 2 DAYS THEN 3 TABS ONCE DAILY F TABLET ORAL
Qty: 16 | Refills: 0 | Status: ON HOLD | COMMUNITY
Start: 2019-12-09

## 2024-01-08 RX ORDER — AMLODIPINE BESYLATE 10 MG/1
TAKE 1 TABLET BY MOUTH ONCE DAILY TABLET ORAL
Qty: 90 | Refills: 0 | Status: ON HOLD | COMMUNITY
Start: 2020-02-19

## 2024-01-08 RX ORDER — HYDROCHLOROTHIAZIDE 25 MG/1
TAKE 1 TABLET BY MOUTH ONCE DAILY TABLET ORAL
Qty: 30 | Refills: 0 | Status: ACTIVE | COMMUNITY
Start: 2019-04-15

## 2024-01-08 RX ORDER — DOCUSATE SODIUM 100 MG/1
TAKE 1 CAPSULE BY MOUTH TWICE DAILY AS NEEDED FOR CONSTIPATION CAPSULE, LIQUID FILLED ORAL
Qty: 60 | Refills: 0 | Status: ON HOLD | COMMUNITY
Start: 2020-04-01

## 2024-01-08 RX ORDER — LANSOPRAZOLE 30 MG/1
TAKE 1 CAPSULE TWICE DAILY CAPSULE, DELAYED RELEASE ORAL
Qty: 60 | Refills: 0 | Status: ON HOLD | COMMUNITY
Start: 2020-05-28

## 2024-01-08 NOTE — HPI-TODAY'S VISIT:
Ms. Soriano is a 49 yo female with a history of indeterminate colitis, diarrhea predominant IBS, bile salt induced diarrhea, chronic abdominal pain, GERD and hypertension presenting for follow-up of constipation. She completed a second course of Xfiaxan in December.  Her diarrhea and bloating has resolved.  She is now having more constipation.  She uses MiraLAX she will have diarrhea.  Last week.  She denies abdominal pain, blood in stool or weight loss.  Prior MRI enterography 6/28/23 revealed no evidence of bowel wall thickening, inflammation or mucosal enhancement. Physical Therapy/Nursing

## 2024-01-20 ENCOUNTER — WEB ENCOUNTER (OUTPATIENT)
Dept: URBAN - METROPOLITAN AREA CLINIC 113 | Facility: CLINIC | Age: 49
End: 2024-01-20

## 2024-05-06 ENCOUNTER — DASHBOARD ENCOUNTERS (OUTPATIENT)
Age: 49
End: 2024-05-06

## 2024-05-06 ENCOUNTER — OFFICE VISIT (OUTPATIENT)
Dept: URBAN - METROPOLITAN AREA CLINIC 113 | Facility: CLINIC | Age: 49
End: 2024-05-06
Payer: COMMERCIAL

## 2024-05-06 VITALS
DIASTOLIC BLOOD PRESSURE: 83 MMHG | SYSTOLIC BLOOD PRESSURE: 110 MMHG | WEIGHT: 166 LBS | HEART RATE: 84 BPM | TEMPERATURE: 97.3 F | BODY MASS INDEX: 25.16 KG/M2 | RESPIRATION RATE: 20 BRPM | HEIGHT: 68 IN

## 2024-05-06 DIAGNOSIS — K58.2 IRRITABLE BOWEL SYNDROME WITH CONSTIPATION AND DIARRHEA: ICD-10-CM

## 2024-05-06 DIAGNOSIS — R11.10 REGURGITATION OF STOMACH CONTENTS: ICD-10-CM

## 2024-05-06 DIAGNOSIS — K31.84 GASTROPARESIS: ICD-10-CM

## 2024-05-06 DIAGNOSIS — K21.9 GERD: ICD-10-CM

## 2024-05-06 DIAGNOSIS — R14.0 ABDOMINAL BLOATING: ICD-10-CM

## 2024-05-06 PROCEDURE — 99214 OFFICE O/P EST MOD 30 MIN: CPT | Performed by: NURSE PRACTITIONER

## 2024-05-06 RX ORDER — LISINOPRIL 20 MG/1
TK 1 T PO QD TABLET ORAL
Qty: 90 | Refills: 0 | Status: ON HOLD | COMMUNITY
Start: 2020-04-08

## 2024-05-06 RX ORDER — COLESTIPOL HYDROCHLORIDE 1 G/1
1 TABLETS TABLET, FILM COATED ORAL ONCE A DAY
Qty: 90 TABLET | Refills: 3 | Status: ACTIVE | COMMUNITY
Start: 2023-07-03

## 2024-05-06 RX ORDER — ONDANSETRON 4 MG/1
DISSOLVE 1 TABLET IN MOUTH EVERY 8 HOURS AS NEEDED FOR NAUSEA AND VOMI TABLET, ORALLY DISINTEGRATING ORAL
Qty: 10 | Refills: 0 | Status: ON HOLD | COMMUNITY
Start: 2020-03-08

## 2024-05-06 RX ORDER — SUMATRIPTAN SUCCINATE 50 MG/1
TAKE 1 TABLET BY MOUTH ONCE DAILY AS NEEDED FOR MIGRAINE HEADACHE TABLET, FILM COATED ORAL
Qty: 9 | Refills: 0 | Status: ON HOLD | COMMUNITY
Start: 2019-10-16

## 2024-05-06 RX ORDER — DOXYCYCLINE 100 MG/1
TAKE 1 CAPSULE BY MOUTH TWICE DAILY FOR 10 DAYS CAPSULE ORAL
Qty: 20 | Refills: 0 | Status: ON HOLD | COMMUNITY
Start: 2019-12-19

## 2024-05-06 RX ORDER — OLMESARTAN MEDOXOMIL 20 MG/1
TAKE 1 TABLET BY MOUTH ONCE DAILY TABLET, FILM COATED ORAL
Qty: 30 | Refills: 0 | Status: ON HOLD | COMMUNITY
Start: 2020-04-24

## 2024-05-06 RX ORDER — OLMESARTAN MEDOXOMIL 40 MG/1
TK 2 TS PO D FOR 60 DAYS TABLET ORAL
Qty: 30 | Refills: 0 | Status: ON HOLD | COMMUNITY
Start: 2020-05-15

## 2024-05-06 RX ORDER — HYOSCYAMINE SULFATE 0.12 MG/1
1 TABLET UNDER THE TONGUE AND ALLOW TO DISSOLVE  AS NEEDED TABLET, ORALLY DISINTEGRATING ORAL
Qty: 60 | Refills: 2 | Status: ACTIVE | COMMUNITY

## 2024-05-06 RX ORDER — LEVOCETIRIZINE DIHYDROCHLORIDE 5 MG/1
TAKE 1 TABLET BY MOUTH AT BEDTIME TABLET ORAL
Qty: 30 | Refills: 0 | Status: ON HOLD | COMMUNITY
Start: 2018-08-13

## 2024-05-06 RX ORDER — LANSOPRAZOLE 30 MG/1
1 CAPSULE BEFORE A MEAL CAPSULE, DELAYED RELEASE ORAL ONCE A DAY
Status: ACTIVE | COMMUNITY

## 2024-05-06 RX ORDER — CLINDAMYCIN HYDROCHLORIDE 300 MG/1
TAKE 1 CAPSULE BY MOUTH THREE TIMES DAILY CAPSULE ORAL
Qty: 15 | Refills: 0 | Status: ON HOLD | COMMUNITY
Start: 2020-01-08

## 2024-05-06 RX ORDER — SUCRALFATE 1 G/10ML
TK 10ML PO QID SUSPENSION ORAL
Qty: 420 | Refills: 0 | Status: ON HOLD | COMMUNITY
Start: 2020-03-27

## 2024-05-06 RX ORDER — POTASSIUM CHLORIDE 20 MEQ/1
TAKE 1 TABLET BY MOUTH TWICE DAILY TABLET, EXTENDED RELEASE ORAL
Qty: 6 | Refills: 0 | Status: ON HOLD | COMMUNITY
Start: 2020-01-04

## 2024-05-06 RX ORDER — DOCUSATE SODIUM 100 MG/1
TAKE 1 CAPSULE BY MOUTH TWICE DAILY AS NEEDED FOR CONSTIPATION CAPSULE, LIQUID FILLED ORAL
Qty: 60 | Refills: 0 | Status: ON HOLD | COMMUNITY
Start: 2020-04-01

## 2024-05-06 RX ORDER — CLARITHROMYCIN 500 MG/1
TAKE 1 TABLET BY MOUTH TWICE DAILY TABLET, FILM COATED ORAL
Qty: 20 | Refills: 0 | Status: ON HOLD | COMMUNITY
Start: 2020-03-02

## 2024-05-06 RX ORDER — DEXLANSOPRAZOLE 30 MG/1
TAKE 1 CAPSULE DAILY EVERY MORNING BEFORE BREAKFAST CAPSULE, DELAYED RELEASE ORAL
Refills: 3 | Status: ON HOLD | COMMUNITY

## 2024-05-06 RX ORDER — CEPHALEXIN 500 MG/1
TK ONE C PO QID FOR 7 DAYS CAPSULE ORAL
Qty: 28 | Refills: 0 | Status: ON HOLD | COMMUNITY
Start: 2020-05-24

## 2024-05-06 RX ORDER — WHEAT DEXTRIN 3 G/3.5 G
1 TABLESPOON POWDER (GRAM) ORAL DAILY
Qty: 90 | Refills: 1 | Status: ACTIVE | COMMUNITY

## 2024-05-06 RX ORDER — POTASSIUM CHLORIDE 750 MG/1
TAKE 1 TABLET BY MOUTH ONCE DAILY TABLET, EXTENDED RELEASE ORAL
Qty: 10 | Refills: 0 | Status: ON HOLD | COMMUNITY
Start: 2020-04-01

## 2024-05-06 RX ORDER — OLMESARTAN MEDOXOMIL 40 MG/1
TK 1 T PO ONCE A DAY TABLET ORAL
Qty: 30 | Refills: 0 | Status: ON HOLD | COMMUNITY
Start: 2020-05-11

## 2024-05-06 RX ORDER — FAMOTIDINE 40 MG/1
TAKE 1 TABLET BY MOUTH TWICE DAILY AS NEEDED TABLET ORAL
Qty: 60 | Refills: 0 | Status: ACTIVE | COMMUNITY
Start: 2019-10-16

## 2024-05-06 RX ORDER — SULFAMETHOXAZOLE AND TRIMETHOPRIM 800; 160 MG/1; MG/1
TAKE 1 TABLET BY MOUTH TWICE DAILY TABLET ORAL
Qty: 6 | Refills: 0 | Status: ON HOLD | COMMUNITY
Start: 2019-05-24

## 2024-05-06 RX ORDER — RIFAXIMIN 550 MG/1
1 TABLET TABLET ORAL THREE TIMES A DAY
Qty: 42 TABLET | Refills: 2 | Status: ON HOLD | COMMUNITY
Start: 2024-03-26 | End: 2024-05-07

## 2024-05-06 RX ORDER — KETOROLAC TROMETHAMINE 10 MG/1
TAKE 1 TABLET BY MOUTH EVERY 12 HOURS AS NEEDED FOR PAIN TABLET, FILM COATED ORAL
Qty: 10 | Refills: 0 | Status: ON HOLD | COMMUNITY
Start: 2019-12-07

## 2024-05-06 RX ORDER — CLOTRIMAZOLE AND BETAMETHASONE DIPROPIONATE 10; .5 MG/G; MG/G
APPLY CREAM TO AFFECTED AREA TWICE DAILY FOR 7 DAYS CREAM TOPICAL
Qty: 15 | Refills: 0 | Status: ON HOLD | COMMUNITY
Start: 2019-12-09

## 2024-05-06 RX ORDER — AMLODIPINE BESYLATE 5 MG/1
TK 1 T PO QD TABLET ORAL
Qty: 30 | Refills: 0 | Status: ON HOLD | COMMUNITY
Start: 2020-01-05

## 2024-05-06 RX ORDER — MONTELUKAST SODIUM 10 MG/1
TAKE 1 TABLET BY MOUTH ONCE DAILY TABLET, FILM COATED ORAL
Qty: 30 | Refills: 0 | Status: ON HOLD | COMMUNITY
Start: 2019-04-15

## 2024-05-06 RX ORDER — ESOMEPRAZOLE MAGNESIUM 40 MG/1
TAKE 1 CAPSULE BY MOUTH ONCE DAILY CAPSULE, DELAYED RELEASE PELLETS ORAL
Qty: 30 | Refills: 0 | Status: ON HOLD | COMMUNITY
Start: 2020-01-19

## 2024-05-06 RX ORDER — PLECANATIDE 3 MG/1
1 TABLET TABLET ORAL ONCE A DAY
Qty: 90 TABLET | Refills: 2 | Status: ON HOLD | COMMUNITY
Start: 2024-01-08 | End: 2024-10-04

## 2024-05-06 RX ORDER — PREDNISONE 10 MG/1
TAKE 4 TABLETS BY MOUTH ONCE DAILY FOR 2 DAYS THEN 3 TABS ONCE DAILY F TABLET ORAL
Qty: 16 | Refills: 0 | Status: ON HOLD | COMMUNITY
Start: 2019-12-09

## 2024-05-06 RX ORDER — PROMETHAZINE HYDROCHLORIDE 25 MG/1
TAKE 1 TABLET BY MOUTH EVERY 6 HOURS AS NEEDED FOR NAUSEA FOR UP TO 7 TABLET ORAL
Qty: 20 | Refills: 0 | Status: ON HOLD | COMMUNITY
Start: 2020-04-12

## 2024-05-06 RX ORDER — SUMATRIPTAN SUCCINATE 25 MG/1
TAKE 1 TABLET BY MOUTH EVERY 2 HOURS AS NEEDED FOR MIGRAINE TABLET ORAL
Qty: 9 | Refills: 0 | Status: ON HOLD | COMMUNITY
Start: 2020-02-19

## 2024-05-06 RX ORDER — DICYCLOMINE HYDROCHLORIDE 20 MG/1
TAKE 1 TABLET PO EVERY 6 HOURS PRN TABLET ORAL
Qty: 20 | Refills: 0 | Status: ON HOLD | COMMUNITY
Start: 2020-05-19

## 2024-05-06 RX ORDER — HYDROCHLOROTHIAZIDE 12.5 MG/1
TAKE 1 TABLET BY MOUTH ONCE DAILY TABLET ORAL
Qty: 30 | Refills: 0 | Status: ON HOLD | COMMUNITY
Start: 2019-10-16

## 2024-05-06 RX ORDER — SUCRALFATE 1 G/1
TK 1 T PO BID BEFORE MEALS TABLET ORAL
Qty: 20 | Refills: 0 | Status: ON HOLD | COMMUNITY
Start: 2020-02-25

## 2024-05-06 RX ORDER — CETIRIZINE HYDROCHLORIDE 10 MG/1
TAKE 1 TABLET BY MOUTH ONCE DAILY TABLET, FILM COATED ORAL
Qty: 14 | Refills: 0 | Status: ACTIVE | COMMUNITY
Start: 2019-12-07

## 2024-05-06 RX ORDER — BUDESONIDE 3 MG/1
TAKE 3 CAPSULES BY MOUTH IN THE MORNING CAPSULE, COATED PELLETS ORAL
Status: ON HOLD | COMMUNITY
Start: 2020-05-07

## 2024-05-06 RX ORDER — OMEPRAZOLE 40 MG/1
TK 1 C PO BID CAPSULE, DELAYED RELEASE ORAL
Qty: 180 | Refills: 0 | Status: ON HOLD | COMMUNITY
Start: 2019-03-13

## 2024-05-06 RX ORDER — HYDROCHLOROTHIAZIDE 25 MG/1
TAKE 1 TABLET BY MOUTH ONCE DAILY TABLET ORAL
Qty: 30 | Refills: 0 | Status: ACTIVE | COMMUNITY
Start: 2019-04-15

## 2024-05-06 RX ORDER — CIPROFLOXACIN HYDROCHLORIDE 500 MG/1
TAKE 1 TABLET BY MOUTH TWICE DAILY TABLET, FILM COATED ORAL
Qty: 20 | Refills: 0 | Status: ON HOLD | COMMUNITY
Start: 2019-10-16

## 2024-05-06 RX ORDER — HYDROCODONE BITARTRATE AND ACETAMINOPHEN 5; 325 MG/1; MG/1
TK 1 T PO Q 6 H PRN FOR PAIN TABLET ORAL
Qty: 10 | Refills: 0 | Status: ON HOLD | COMMUNITY
Start: 2020-02-25

## 2024-05-06 RX ORDER — OMEPRAZOLE 20 MG/1
TAKE 1 CAPSULE BY MOUTH ONCE DAILY CAPSULE, DELAYED RELEASE ORAL
Qty: 30 | Refills: 0 | Status: ON HOLD | COMMUNITY
Start: 2018-09-10

## 2024-05-06 RX ORDER — PANTOPRAZOLE SODIUM 40 MG/1
TAKE 1 TABLET BY MOUTH ONCE DAILY IF SYMPTOMS PERSIST AFTER 1 WEEK TAK TABLET, DELAYED RELEASE ORAL
Qty: 53 | Refills: 0 | Status: ON HOLD | COMMUNITY
Start: 2020-02-28

## 2024-05-06 RX ORDER — AMLODIPINE BESYLATE 10 MG/1
TAKE 1 TABLET BY MOUTH ONCE DAILY TABLET ORAL
Qty: 90 | Refills: 0 | Status: ON HOLD | COMMUNITY
Start: 2020-02-19

## 2024-05-06 RX ORDER — AMOXICILLIN 500 MG/1
TAKE 1 CAPSULE BY MOUTH EVERY 12 HOURS CAPSULE ORAL
Qty: 20 | Refills: 0 | Status: ON HOLD | COMMUNITY
Start: 2019-12-07

## 2024-05-06 RX ORDER — METOCLOPRAMIDE 10 MG/1
TAKE 1 TABLET BY MOUTH EVERY 6 TO 8 HOURS WITH MEALS TABLET ORAL
Qty: 120 | Refills: 0 | Status: ON HOLD | COMMUNITY
Start: 2020-03-28

## 2024-05-06 RX ORDER — BUTALBITAL, ACETAMINOPHEN, AND CAFFEINE 50; 325; 40 MG/1; MG/1; MG/1
TK 1-2 TS PO Q 6 H PRF HA TABLET ORAL
Qty: 20 | Refills: 0 | Status: ON HOLD | COMMUNITY
Start: 2020-04-22

## 2024-05-06 RX ORDER — LANSOPRAZOLE 30 MG/1
TAKE 1 CAPSULE TWICE DAILY CAPSULE, DELAYED RELEASE ORAL
Qty: 60 | Refills: 0 | Status: ON HOLD | COMMUNITY
Start: 2020-05-28

## 2024-05-06 RX ORDER — POLYETHYLENE GLYCOL 3350, SODIUM CHLORIDE, SODIUM BICARBONATE AND POTASSIUM CHLORIDE WITH LEMON FLAVOR 420; 11.2; 5.72; 1.48 G/4L; G/4L; G/4L; G/4L
MIX AND DRINK UTD POWDER, FOR SOLUTION ORAL
Qty: 4000 | Refills: 0 | Status: ON HOLD | COMMUNITY
Start: 2020-04-27

## 2024-05-06 RX ORDER — FLUTICASONE PROPIONATE 50 UG/1
USE 2 SPRAY(S) IN EACH NOSTRIL ONCE DAILY SPRAY, METERED NASAL
Qty: 16 | Refills: 0 | Status: ACTIVE | COMMUNITY
Start: 2019-12-07

## 2024-05-09 ENCOUNTER — OFFICE VISIT (OUTPATIENT)
Dept: URBAN - METROPOLITAN AREA CLINIC 113 | Facility: CLINIC | Age: 49
End: 2024-05-09

## 2024-09-06 ENCOUNTER — OFFICE VISIT (OUTPATIENT)
Dept: URBAN - METROPOLITAN AREA CLINIC 113 | Facility: CLINIC | Age: 49
End: 2024-09-06

## 2024-11-01 ENCOUNTER — OFFICE VISIT (OUTPATIENT)
Dept: URBAN - METROPOLITAN AREA CLINIC 113 | Facility: CLINIC | Age: 49
End: 2024-11-01